# Patient Record
Sex: MALE | Race: WHITE | NOT HISPANIC OR LATINO | Employment: UNEMPLOYED | URBAN - METROPOLITAN AREA
[De-identification: names, ages, dates, MRNs, and addresses within clinical notes are randomized per-mention and may not be internally consistent; named-entity substitution may affect disease eponyms.]

---

## 2021-05-28 ENCOUNTER — MEDICAL CORRESPONDENCE (OUTPATIENT)
Dept: HEALTH INFORMATION MANAGEMENT | Facility: CLINIC | Age: 56
End: 2021-05-28

## 2021-06-01 ENCOUNTER — TRANSCRIBE ORDERS (OUTPATIENT)
Dept: OTHER | Age: 56
End: 2021-06-01

## 2021-06-01 DIAGNOSIS — N02.2 MEMBRANOUS NEPHROPATHY DETERMINED BY BIOPSY: Primary | ICD-10-CM

## 2021-06-03 ENCOUNTER — VIRTUAL VISIT (OUTPATIENT)
Dept: CARDIOLOGY | Facility: CLINIC | Age: 56
End: 2021-06-03
Payer: COMMERCIAL

## 2021-06-03 ENCOUNTER — TRANSCRIBE ORDERS (OUTPATIENT)
Dept: OTHER | Age: 56
End: 2021-06-03

## 2021-06-03 DIAGNOSIS — E78.5 HYPERLIPIDEMIA LDL GOAL <100: ICD-10-CM

## 2021-06-03 DIAGNOSIS — N05.2 IDIOPATHIC MEMBRANOUS GLOMERULONEPHRITIS: ICD-10-CM

## 2021-06-03 DIAGNOSIS — R93.1 ABNORMAL SCREENING CARDIAC CT: ICD-10-CM

## 2021-06-03 DIAGNOSIS — R07.9 EXERTIONAL CHEST PAIN: Primary | ICD-10-CM

## 2021-06-03 DIAGNOSIS — I10 BENIGN ESSENTIAL HYPERTENSION: ICD-10-CM

## 2021-06-03 DIAGNOSIS — N02.2 MEMBRANOUS NEPHROPATHY DETERMINED BY BIOPSY: Primary | ICD-10-CM

## 2021-06-03 DIAGNOSIS — R06.09 DYSPNEA ON EXERTION: ICD-10-CM

## 2021-06-03 PROCEDURE — 99205 OFFICE O/P NEW HI 60 MIN: CPT | Mod: 95 | Performed by: INTERNAL MEDICINE

## 2021-06-03 RX ORDER — ISOSORBIDE MONONITRATE 30 MG/1
30 TABLET, EXTENDED RELEASE ORAL
COMMUNITY
Start: 2020-10-05

## 2021-06-03 RX ORDER — NITROGLYCERIN 0.4 MG/1
0.4 TABLET SUBLINGUAL
COMMUNITY
Start: 2020-09-01

## 2021-06-03 RX ORDER — METOPROLOL SUCCINATE 25 MG/1
25 TABLET, EXTENDED RELEASE ORAL
COMMUNITY
Start: 2020-09-01

## 2021-06-03 RX ORDER — FUROSEMIDE 20 MG
TABLET ORAL
COMMUNITY
Start: 2020-09-09 | End: 2021-10-27 | Stop reason: ALTCHOICE

## 2021-06-03 RX ORDER — LISINOPRIL 5 MG/1
5 TABLET ORAL
COMMUNITY
Start: 2016-09-01 | End: 2021-08-20

## 2021-06-03 RX ORDER — PREDNISONE 10 MG/1
TABLET ORAL
COMMUNITY
Start: 2020-09-01 | End: 2021-08-20 | Stop reason: DRUGHIGH

## 2021-06-03 RX ORDER — ISOSORBIDE DINITRATE 30 MG/1
TABLET ORAL
COMMUNITY
Start: 2020-09-01

## 2021-06-03 RX ORDER — ATORVASTATIN CALCIUM 40 MG/1
40 TABLET, FILM COATED ORAL
COMMUNITY
Start: 2020-09-01 | End: 2021-09-03

## 2021-06-03 RX ORDER — FUROSEMIDE 20 MG
20 TABLET ORAL
COMMUNITY
Start: 2016-09-01 | End: 2021-10-27 | Stop reason: ALTCHOICE

## 2021-06-03 RX ORDER — ASPIRIN 81 MG/1
81 TABLET, CHEWABLE ORAL
COMMUNITY
Start: 2020-09-01

## 2021-06-03 NOTE — PATIENT INSTRUCTIONS
Thank you for coming to the [facility] @ [place]; please note the following instructions:    1. Labs scheduled        If you have any questions regarding your visit please contact your care team:     Cardiology  Telephone Number   Christin PAGE, RN  Savanna CARL, RN   Amee HOROWITZ, GWEN FISH, GWEN VILLALBA, LPN   [phone] (option 1)   For scheduling appts:     [phone] (select option 1)       For the Device Clinic (Pacemakers and ICD's)  RN's :  Mahi Ngo   During business hours: [phone]    *After business hours:  [phone] (select option 4)      Normal test result notifications will be released via [employer] or mailed within 7 business days.  All other test results, will be communicated via telephone once reviewed by your cardiologist.    If you need a medication refill please contact your pharmacy.  Please allow 3 business days for your refill to be completed.    As always, thank you for trusting us with your health care needs!

## 2021-06-03 NOTE — LETTER
6/3/2021       RE: Alex Etienne  [address] [place] [address]       Alex is a 56 year old who is being evaluated via a billable video visit.        How would you like to obtain your AVS? MyChart  If the video visit is dropped, the invitation should be resent by: Other e-mail: [email or web address]  Will anyone else be joining your video visit? No    Video Start Time: 3.08PM  Video-Visit Details    Type of service:  Video Visit     Video End Time:3.21PM.    Originating Location (pt. Location): Home    Distant Location (provider location):  [facility] [place]     Platform used for Video Visit: [facility]     Leonidas Johnson is a 56 year old who presents for evaluation of exertional chest pain and shortness of breath.    HPI   Patient was a marathon runner.  Worked in [employer].  In 2019 he could run 8 miles without problem.  Lately he is unable to run more than 2 blocks due to chest heaviness and shortness of breath.  He can walk any distance without symptoms.  Go up and down the steps few times but any running because of chest discomfort.  This is progressive.  He has no prior cardiac history.  No prior cardiac symptoms.  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  He was treated with steroids and later on rituximab.  He do have hypertension and hyper dyslipidemia with LDL about 260.  He had nephrotic syndrome at the diagnosis of glomerulonephritis.  Currently patient is very much restricted and he is unable to run.  He is very concerned.    Review of Systems   Constitutional, HEENT, cardiovascular, pulmonary, gi and gu systems are negative, except as otherwise noted.      Objective    Vitals - Patient Reported  Systolic (Patient Reported): 134  Diastolic (Patient Reported): 64  Weight (Patient Reported): 138.3 kg (305 lb)  Pulse (Patient Reported): 64        Physical Exam   GENERAL: Healthy,  alert and no distress  EYES: Eyes grossly normal to inspection.  No discharge or erythema, or obvious scleral/conjunctival abnormalities.  RESP: No audible wheeze, cough, or visible cyanosis.  No visible retractions or increased work of breathing.    SKIN: Visible skin clear. No significant rash, abnormal pigmentation or lesions.  NEURO: Cranial nerves grossly intact.  Mentation and speech appropriate for age.  PSYCH: Mentation appears normal, affect normal/bright, judgement and insight intact, normal speech and appearance well-groomed.    ASSESSMENT/PLAN:    Patient here for evaluation of exertional chest pain and shortness of breath.  Prior to 2020 he was a marathon runner without any symptoms.  He could run more than 8 miles without any problem.  Currently patient is unable to run more than 2 blocks due to chest heaviness and shortness of breath.  Symptoms are relieved by rest.  He has no significant prior cardiac history.  He was diagnosed with a renal vein thrombosis and idiopathic membranous glomerulonephritis in 2020.  Initially treated with steroids and currently he is on rituximab.  Patient explained his shortness of breath and have asked evaluated at [facility].  His cardiac risk factors are hypertension and hyperlipidemia with LDL above 260 on 40 mg of Lipitor he is a non-smoker.  No diabetes.  No premature coronary artery disease in family.  Reviewed records from [facility].  He had an exercise stress nuclear stress test at [facility].  This was normal with normal ejection fraction and no regional wall motion abnormalities.  But the CT portion revealed a calcium score of 9 on 9 Agatston units.  Left main 0, .6, circumflex 55.1 and .7.  Patient was empirically started on isosorbide mononitrate.  Currently patient's activities are limited by his exertional symptoms.  His last creatinine was checked a year ago.  Which was 1.5.  Due to his progressive nature of symptoms and activity  limitation and heavy coronary calcification patient will need a coronary angiogram.  Risk benefit explained.  Contrast agent and renal dysfunction discussed.  We will repeat a basic metabolic panel.  If the creatinine is stable patient will be scheduled for a coronary angiogram.  He will also require better control of his lipid profile.  This will be addressed after the coronary angiogram.  Meanwhile patient is advised to continue his current medications.    Total visit duration 60 minutes.  This includes video interview, chart review, review of Care Everywhere, review of lab results, echocardiogram, stress test result and documentation.      LARA Khan MD

## 2021-06-03 NOTE — LETTER
6/3/2021      RE: Alex Etienne  [address] [place] [address]       Dear Colleague,    Thank you for the opportunity to participate in the care of your patient, Alex Etienne, at the [facility]. Please see a copy of my visit note below.    Alex is a 56 year old who is being evaluated via a billable video visit.        How would you like to obtain your AVS? MyChart  If the video visit is dropped, the invitation should be resent by: Other e-mail: [employer]  Will anyone else be joining your video visit? No    Video Start Time: 3.08PM  Video-Visit Details    Type of service:  Video Visit     Video End Time:3.21PM.    Originating Location (pt. Location): Home    Distant Location (provider location):  [facility]     Platform used for Video Visit: [employer]     Leonidas   Alex is a 56 year old who presents for evaluation of exertional chest pain and shortness of breath.    HPI   Patient was a marathon runner.  Worked in [employer].  In 2019 he could run 8 miles without problem.  Lately he is unable to run more than 2 blocks due to chest heaviness and shortness of breath.  He can walk any distance without symptoms.  Go up and down the steps few times but any running because of chest discomfort.  This is progressive.  He has no prior cardiac history.  No prior cardiac symptoms.  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  He was treated with steroids and later on rituximab.  He do have hypertension and hyper dyslipidemia with LDL about 260.  He had nephrotic syndrome at the diagnosis of glomerulonephritis.  Currently patient is very much restricted and he is unable to run.  He is very concerned.    Review of Systems   Constitutional, HEENT, cardiovascular, pulmonary, gi and gu systems are negative,  except as otherwise noted.      Objective    Vitals - Patient Reported  Systolic (Patient Reported): 134  Diastolic (Patient Reported): 64  Weight (Patient Reported): 138.3 kg (305 lb)  Pulse (Patient Reported): 64        Physical Exam   GENERAL: Healthy, alert and no distress  EYES: Eyes grossly normal to inspection.  No discharge or erythema, or obvious scleral/conjunctival abnormalities.  RESP: No audible wheeze, cough, or visible cyanosis.  No visible retractions or increased work of breathing.    SKIN: Visible skin clear. No significant rash, abnormal pigmentation or lesions.  NEURO: Cranial nerves grossly intact.  Mentation and speech appropriate for age.  PSYCH: Mentation appears normal, affect normal/bright, judgement and insight intact, normal speech and appearance well-groomed.    ASSESSMENT/PLAN:    Patient here for evaluation of exertional chest pain and shortness of breath.  Prior to 2020 he was a marathon runner without any symptoms.  He could run more than 8 miles without any problem.  Currently patient is unable to run more than 2 blocks due to chest heaviness and shortness of breath.  Symptoms are relieved by rest.  He has no significant prior cardiac history.  He was diagnosed with a renal vein thrombosis and idiopathic membranous glomerulonephritis in 2020.  Initially treated with steroids and currently he is on rituximab.  Patient explained his shortness of breath and have asked evaluated at [facility].  His cardiac risk factors are hypertension and hyperlipidemia with LDL above 260 on 40 mg of Lipitor he is a non-smoker.  No diabetes.  No premature coronary artery disease in family.  Reviewed records from [facility].  He had an exercise stress nuclear stress test at [facility].  This was normal with normal ejection fraction and no regional wall motion abnormalities.  But the CT portion revealed a calcium score of 9 on 9 Agatston units.  Left main 0, .6, circumflex 55.1 and RCA  682.7.  Patient was empirically started on isosorbide mononitrate.  Currently patient's activities are limited by his exertional symptoms.  His last creatinine was checked a year ago.  Which was 1.5.  Due to his progressive nature of symptoms and activity limitation and heavy coronary calcification patient will need a coronary angiogram.  Risk benefit explained.  Contrast agent and renal dysfunction discussed.  We will repeat a basic metabolic panel.  If the creatinine is stable patient will be scheduled for a coronary angiogram.  He will also require better control of his lipid profile.  This will be addressed after the coronary angiogram.  Meanwhile patient is advised to continue his current medications.    Total visit duration 60 minutes.  This includes video interview, chart review, review of Care Everywhere, review of lab results, echocardiogram, stress test result and documentation.      Please do not hesitate to contact me if you have any questions/concerns.     Sincerely,     LARA Khan MD

## 2021-06-03 NOTE — PROGRESS NOTES
Alex is a 56 year old who is being evaluated via a billable video visit.        How would you like to obtain your AVS? [employer]  If the video visit is dropped, the invitation should be resent by: Other e-mail: [employer]  Will anyone else be joining your video visit? No    Video Start Time: 3.08PM  Video-Visit Details    Type of service:  Video Visit     Video End Time:3.21PM.    Originating Location (pt. Location): Home    Distant Location (provider location):  [facility]     Platform used for Video Visit: [facility]     Leonidas   Alex is a 56 year old who presents for evaluation of exertional chest pain and shortness of breath.    HPI   Patient was a marathon runner.  Worked in [employer].  In 2019 he could run 8 miles without problem.  Lately he is unable to run more than 2 blocks due to chest heaviness and shortness of breath.  He can walk any distance without symptoms.  Go up and down the steps few times but any running because of chest discomfort.  This is progressive.  He has no prior cardiac history.  No prior cardiac symptoms.  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  He was treated with steroids and later on rituximab.  He do have hypertension and hyper dyslipidemia with LDL about 260.  He had nephrotic syndrome at the diagnosis of glomerulonephritis.  Currently patient is very much restricted and he is unable to run.  He is very concerned.    Review of Systems   Constitutional, HEENT, cardiovascular, pulmonary, gi and gu systems are negative, except as otherwise noted.      Objective    Vitals - Patient Reported  Systolic (Patient Reported): 134  Diastolic (Patient Reported): 64  Weight (Patient Reported): 138.3 kg (305 lb)  Pulse (Patient Reported): 64        Physical Exam   GENERAL: Healthy, alert and no distress  EYES: Eyes grossly normal to inspection.  No discharge or  erythema, or obvious scleral/conjunctival abnormalities.  RESP: No audible wheeze, cough, or visible cyanosis.  No visible retractions or increased work of breathing.    SKIN: Visible skin clear. No significant rash, abnormal pigmentation or lesions.  NEURO: Cranial nerves grossly intact.  Mentation and speech appropriate for age.  PSYCH: Mentation appears normal, affect normal/bright, judgement and insight intact, normal speech and appearance well-groomed.    ASSESSMENT/PLAN:    Patient here for evaluation of exertional chest pain and shortness of breath.  Prior to 2020 he was a marathon runner without any symptoms.  He could run more than 8 miles without any problem.  Currently patient is unable to run more than 2 blocks due to chest heaviness and shortness of breath.  Symptoms are relieved by rest.  He has no significant prior cardiac history.  He was diagnosed with a renal vein thrombosis and idiopathic membranous glomerulonephritis in 2020.  Initially treated with steroids and currently he is on rituximab.  Patient explained his shortness of breath and have asked evaluated at [facility].  His cardiac risk factors are hypertension and hyperlipidemia with LDL above 260 on 40 mg of Lipitor he is a non-smoker.  No diabetes.  No premature coronary artery disease in family.  Reviewed records from [facility].  He had an exercise stress nuclear stress test at [facility].  This was normal with normal ejection fraction and no regional wall motion abnormalities.  But the CT portion revealed a calcium score of 9 on 9 Agatston units.  Left main 0, .6, circumflex 55.1 and .7.  Patient was empirically started on isosorbide mononitrate.  Currently patient's activities are limited by his exertional symptoms.  His last creatinine was checked a year ago.  Which was 1.5.  Due to his progressive nature of symptoms and activity limitation and heavy coronary calcification patient will need a coronary angiogram.   Risk benefit explained.  Contrast agent and renal dysfunction discussed.  We will repeat a basic metabolic panel.  If the creatinine is stable patient will be scheduled for a coronary angiogram.  He will also require better control of his lipid profile.  This will be addressed after the coronary angiogram.  Meanwhile patient is advised to continue his current medications.    Total visit duration 60 minutes.  This includes video interview, chart review, review of Care Everywhere, review of lab results, echocardiogram, stress test result and documentation.

## 2021-06-03 NOTE — NURSING NOTE
Vitals - Patient Reported  Systolic (Patient Reported): 134  Diastolic (Patient Reported): 64  Weight (Patient Reported): 138.3 kg (305 lb)  Pulse (Patient Reported): 64    Alexsandra Cleveland MA

## 2021-06-04 DIAGNOSIS — I10 BENIGN ESSENTIAL HYPERTENSION: ICD-10-CM

## 2021-06-04 DIAGNOSIS — Z11.59 ENCOUNTER FOR SCREENING FOR OTHER VIRAL DISEASES: ICD-10-CM

## 2021-06-04 DIAGNOSIS — R06.09 DYSPNEA ON EXERTION: ICD-10-CM

## 2021-06-04 DIAGNOSIS — R07.9 EXERTIONAL CHEST PAIN: ICD-10-CM

## 2021-06-04 PROBLEM — R93.1 ABNORMAL SCREENING CARDIAC CT: Status: ACTIVE | Noted: 2021-06-04

## 2021-06-04 LAB
ANION GAP SERPL CALCULATED.3IONS-SCNC: 4 MMOL/L (ref 3–14)
BUN SERPL-MCNC: 20 MG/DL (ref 7–30)
CALCIUM SERPL-MCNC: 8.7 MG/DL (ref 8.5–10.1)
CHLORIDE SERPL-SCNC: 109 MMOL/L (ref 94–109)
CO2 SERPL-SCNC: 30 MMOL/L (ref 20–32)
CREAT SERPL-MCNC: 1.5 MG/DL (ref 0.66–1.25)
GFR SERPL CREATININE-BSD FRML MDRD: 52 ML/MIN/{1.73_M2}
GLUCOSE SERPL-MCNC: 158 MG/DL (ref 70–99)
POTASSIUM SERPL-SCNC: 3.6 MMOL/L (ref 3.4–5.3)
SODIUM SERPL-SCNC: 143 MMOL/L (ref 133–144)

## 2021-06-04 PROCEDURE — 80048 BASIC METABOLIC PNL TOTAL CA: CPT | Performed by: INTERNAL MEDICINE

## 2021-06-04 PROCEDURE — 36415 COLL VENOUS BLD VENIPUNCTURE: CPT | Performed by: INTERNAL MEDICINE

## 2021-06-07 ENCOUNTER — MYC MEDICAL ADVICE (OUTPATIENT)
Dept: CARDIOLOGY | Facility: CLINIC | Age: 56
End: 2021-06-07

## 2021-06-07 RX ORDER — LIDOCAINE 40 MG/G
CREAM TOPICAL
Status: CANCELLED | OUTPATIENT
Start: 2021-06-07

## 2021-06-07 RX ORDER — SODIUM CHLORIDE 9 MG/ML
INJECTION, SOLUTION INTRAVENOUS CONTINUOUS
Status: CANCELLED | OUTPATIENT
Start: 2021-06-07

## 2021-06-07 NOTE — LETTER
June 7, 2021      TO: Alex Etienne  [address] [place] [address]         Dear Alex,    You are scheduled for a Coronary Angiogram at the [facility], [address] [place] [address], [place] [address].   Please arrive to the [facility] Waiting Room on ____Thursday, June 10th ______  at ____8:00 am_______.         You will need to undergo a COVID-19 PCR swab test within 4 days of procedure. You will receive a phone call with more information. SCHEDULED FOR 6/8 AT 2:30 PM [facility]       When you arrive you will get your labs drawn first then you will be escorted back to the pre procedure area. Here they will insert an IV, and obtain a short medical history. Please bring an updated list of your current medications.     A provider will come and talk with you about the procedure and obtain consent.     A nurse from the Cardiac Catheterization Lab will then escort you to the procedure area. You will be receiving sedation during the procedure so you will need someone to drive you to and from the hospital.     After the procedure you will recover for a short period (2 - 6 hours). You will be discharged with instructions for post procedure care. However, depending on what the angiogram shows you may have to have stents placed and this might require an overnight stay. We ask that you bring a small bag of necessities for your comfort if you would need to stay overnight. DO NOT BRING ANY VALUABLES!        Pre procedure instructions:  It is required that you undergo a COVID-19 PCR swab test 48-72 hours before procedure. SCHEDULED FOR 6/8 AT 2:30 PM [facility]  1. Make arrangements to have a  as you will not be allowed to drive following the procedure. Someone should stay with you the night after your procedure.  2.  Do not eat any solid food or milk products for 8 hours prior to the exam. You may drink clear liquids until 2 hours prior to the exam.  Clear liquids include the following: water, Jell-O, clear broth, apple juice or any non-carbonated drink that you can see through (no pop!).   3. Do not drink alcohol or smoke 24 hours prior to test.  4. Hold these meds:  Do not take your oral diabetic medication or short acting insulin the day of the procedure. Do not take metformin the day of and for 2 days following, contact your PCP regarding glucose control during this time.  5. You may take your other morning medications as prescribed with a sip of water. If you currently take an aspirin, continue taking your same dose. If not, take a 325 mg aspirin the day before and the day of your procedure.            If you have further questions, please utilize [employer] to contact us.   If your question concerns the above instructions, contact:  Christin Lopez RN  Cardiology Care Coordinator  [facility]  [phone] option 1                 Post Procedure Instructions:  For 24 hours:    Have an adult stay with you for 24 hours.    Relax and take it easy.    Drink plenty of fluids.    You may eat your normal diet, unless your doctor tells you otherwise.    Do NOT make any important or legal decisions.    Do NOT drive or operate machines at home or at work.    Do NOT drink alcohol.    Do NOT smoke.     Medicines:    If you have begun Plavix (clopidogrel), Effient (prasugrel), or Brilinta (ticagrelor), do not stop taking it until you talk to your heart doctor (cardiologist).     If you are on metformin (Glucophage), do not restart it until you have blood tests (within 2 to 3 days after discharge). When your doctor tells you it is safe, you may restart the metformin.    If you have stopped any other medicines, check with your nurse or provider about when to restart them.     Care of wrist or arm site:    It is normal to have soreness at the puncture site and mild tingling in your hand for up to 3 days.    Remove the Band-Aid after 24 hours. If  there is minor oozing, apply another Band-aid and remove it after 12 hours.    Do NOT take a bath, or use a hot tub or pool for the next 48 hours. You may shower.    It is normal to have a small bruise. There should not be a lump at the site.    Do not scrub the site.    Do not use lotion or powder near the puncture site for 3 days.     Activity Restrictions    For 2 days, do not use your hand or arm to support your weight (such as rising from a chair) or bend your wrist  (such as lifting a garage door).    For 2 days, do not lift more than 5 pounds or exercise your arm (tennis, golf or bowling).        If you start bleeding from the site in your arm: Sit down and press firmly on the site with your fingers for 10 minutes.  Call your doctor as soon as you can.  Call 911 right away if you have bleeding that is heavy or does not stop.  Call your doctor if:    You have a large or growing hard lump around the site.    The site is red, swollen, hot or tender.

## 2021-06-08 ENCOUNTER — TELEPHONE (OUTPATIENT)
Dept: CARDIOLOGY | Facility: CLINIC | Age: 56
End: 2021-06-08

## 2021-06-08 DIAGNOSIS — Z11.59 ENCOUNTER FOR SCREENING FOR OTHER VIRAL DISEASES: ICD-10-CM

## 2021-06-08 LAB
SARS-COV-2 RNA RESP QL NAA+PROBE: NORMAL
SPECIMEN SOURCE: NORMAL

## 2021-06-08 PROCEDURE — U0005 INFEC AGEN DETEC AMPLI PROBE: HCPCS | Performed by: INTERNAL MEDICINE

## 2021-06-08 PROCEDURE — U0003 INFECTIOUS AGENT DETECTION BY NUCLEIC ACID (DNA OR RNA); SEVERE ACUTE RESPIRATORY SYNDROME CORONAVIRUS 2 (SARS-COV-2) (CORONAVIRUS DISEASE [COVID-19]), AMPLIFIED PROBE TECHNIQUE, MAKING USE OF HIGH THROUGHPUT TECHNOLOGIES AS DESCRIBED BY CMS-2020-01-R: HCPCS | Performed by: INTERNAL MEDICINE

## 2021-06-09 ENCOUNTER — TELEPHONE (OUTPATIENT)
Dept: CARDIOLOGY | Facility: CLINIC | Age: 56
End: 2021-06-09

## 2021-06-09 LAB
LABORATORY COMMENT REPORT: NORMAL
SARS-COV-2 RNA RESP QL NAA+PROBE: NEGATIVE
SPECIMEN SOURCE: NORMAL

## 2021-06-09 NOTE — TELEPHONE ENCOUNTER
Call complete for pre procedure reminder, travel screen and updated visitor policy.  COVID Negative.

## 2021-06-10 ENCOUNTER — APPOINTMENT (OUTPATIENT)
Dept: LAB | Facility: CLINIC | Age: 56
End: 2021-06-10
Attending: INTERNAL MEDICINE
Payer: COMMERCIAL

## 2021-06-10 ENCOUNTER — HOSPITAL ENCOUNTER (OUTPATIENT)
Facility: CLINIC | Age: 56
Discharge: HOME | End: 2021-06-10
Attending: INTERNAL MEDICINE | Admitting: INTERNAL MEDICINE
Payer: COMMERCIAL

## 2021-06-10 ENCOUNTER — APPOINTMENT (OUTPATIENT)
Dept: MEDSURG UNIT | Facility: CLINIC | Age: 56
End: 2021-06-10
Attending: INTERNAL MEDICINE
Payer: COMMERCIAL

## 2021-06-10 VITALS
TEMPERATURE: 98.6 F | HEART RATE: 53 BPM | DIASTOLIC BLOOD PRESSURE: 81 MMHG | BODY MASS INDEX: 39.96 KG/M2 | HEIGHT: 72 IN | RESPIRATION RATE: 16 BRPM | WEIGHT: 295 LBS | OXYGEN SATURATION: 93 % | SYSTOLIC BLOOD PRESSURE: 126 MMHG

## 2021-06-10 DIAGNOSIS — I10 BENIGN ESSENTIAL HYPERTENSION: ICD-10-CM

## 2021-06-10 DIAGNOSIS — Z98.890 S/P CORONARY ANGIOGRAM: Primary | ICD-10-CM

## 2021-06-10 DIAGNOSIS — R93.1 ABNORMAL SCREENING CARDIAC CT: ICD-10-CM

## 2021-06-10 DIAGNOSIS — R06.09 DYSPNEA ON EXERTION: ICD-10-CM

## 2021-06-10 DIAGNOSIS — R07.9 EXERTIONAL CHEST PAIN: ICD-10-CM

## 2021-06-10 LAB
ANION GAP SERPL CALCULATED.3IONS-SCNC: 6 MMOL/L (ref 3–14)
BUN SERPL-MCNC: 22 MG/DL (ref 7–30)
CALCIUM SERPL-MCNC: 9 MG/DL (ref 8.5–10.1)
CHLORIDE SERPL-SCNC: 104 MMOL/L (ref 94–109)
CO2 SERPL-SCNC: 29 MMOL/L (ref 20–32)
CREAT SERPL-MCNC: 1.5 MG/DL (ref 0.66–1.25)
ERYTHROCYTE [DISTWIDTH] IN BLOOD BY AUTOMATED COUNT: 13.2 % (ref 10–15)
GFR SERPL CREATININE-BSD FRML MDRD: 52 ML/MIN/{1.73_M2}
GLUCOSE SERPL-MCNC: 121 MG/DL (ref 70–99)
HCT VFR BLD AUTO: 43.7 % (ref 40–53)
HGB BLD-MCNC: 14.4 G/DL (ref 13.3–17.7)
MCH RBC QN AUTO: 28.5 PG (ref 26.5–33)
MCHC RBC AUTO-ENTMCNC: 33 G/DL (ref 31.5–36.5)
MCV RBC AUTO: 87 FL (ref 78–100)
PLATELET # BLD AUTO: 254 10E9/L (ref 150–450)
POTASSIUM SERPL-SCNC: 3.8 MMOL/L (ref 3.4–5.3)
RBC # BLD AUTO: 5.05 10E12/L (ref 4.4–5.9)
SODIUM SERPL-SCNC: 139 MMOL/L (ref 133–144)
WBC # BLD AUTO: 8.9 10E9/L (ref 4–11)

## 2021-06-10 PROCEDURE — 80048 BASIC METABOLIC PNL TOTAL CA: CPT | Performed by: INTERNAL MEDICINE

## 2021-06-10 PROCEDURE — 250N000009 HC RX 250: Performed by: INTERNAL MEDICINE

## 2021-06-10 PROCEDURE — 272N000001 HC OR GENERAL SUPPLY STERILE: Performed by: INTERNAL MEDICINE

## 2021-06-10 PROCEDURE — 250N000011 HC RX IP 250 OP 636: Performed by: INTERNAL MEDICINE

## 2021-06-10 PROCEDURE — 250N000013 HC RX MED GY IP 250 OP 250 PS 637: Performed by: INTERNAL MEDICINE

## 2021-06-10 PROCEDURE — 93458 L HRT ARTERY/VENTRICLE ANGIO: CPT | Performed by: INTERNAL MEDICINE

## 2021-06-10 PROCEDURE — 250N000011 HC RX IP 250 OP 636: Performed by: PHYSICIAN ASSISTANT

## 2021-06-10 PROCEDURE — 258N000003 HC RX IP 258 OP 636: Performed by: INTERNAL MEDICINE

## 2021-06-10 PROCEDURE — 999N000142 HC STATISTIC PROCEDURE PREP ONLY

## 2021-06-10 PROCEDURE — C1894 INTRO/SHEATH, NON-LASER: HCPCS | Performed by: INTERNAL MEDICINE

## 2021-06-10 PROCEDURE — 99152 MOD SED SAME PHYS/QHP 5/>YRS: CPT | Performed by: INTERNAL MEDICINE

## 2021-06-10 PROCEDURE — 93005 ELECTROCARDIOGRAM TRACING: CPT

## 2021-06-10 PROCEDURE — 93010 ELECTROCARDIOGRAM REPORT: CPT | Performed by: INTERNAL MEDICINE

## 2021-06-10 PROCEDURE — 36415 COLL VENOUS BLD VENIPUNCTURE: CPT | Performed by: INTERNAL MEDICINE

## 2021-06-10 PROCEDURE — 999N000054 HC STATISTIC EKG NON-CHARGEABLE

## 2021-06-10 PROCEDURE — 99153 MOD SED SAME PHYS/QHP EA: CPT | Performed by: INTERNAL MEDICINE

## 2021-06-10 PROCEDURE — 85027 COMPLETE CBC AUTOMATED: CPT | Performed by: INTERNAL MEDICINE

## 2021-06-10 RX ORDER — TIROFIBAN HYDROCHLORIDE 50 UG/ML
0.15 INJECTION INTRAVENOUS CONTINUOUS PRN
Status: DISCONTINUED | OUTPATIENT
Start: 2021-06-10 | End: 2021-06-10 | Stop reason: HOSPADM

## 2021-06-10 RX ORDER — NALOXONE HYDROCHLORIDE 0.4 MG/ML
0.4 INJECTION, SOLUTION INTRAMUSCULAR; INTRAVENOUS; SUBCUTANEOUS
Status: DISCONTINUED | OUTPATIENT
Start: 2021-06-10 | End: 2021-06-10 | Stop reason: HOSPADM

## 2021-06-10 RX ORDER — ATROPINE SULFATE 0.1 MG/ML
0.5 INJECTION INTRAVENOUS
Status: DISCONTINUED | OUTPATIENT
Start: 2021-06-10 | End: 2021-06-10 | Stop reason: HOSPADM

## 2021-06-10 RX ORDER — ARGATROBAN 1 MG/ML
350 INJECTION, SOLUTION INTRAVENOUS
Status: DISCONTINUED | OUTPATIENT
Start: 2021-06-10 | End: 2021-06-10 | Stop reason: HOSPADM

## 2021-06-10 RX ORDER — DOPAMINE HYDROCHLORIDE 160 MG/100ML
2-20 INJECTION, SOLUTION INTRAVENOUS CONTINUOUS PRN
Status: DISCONTINUED | OUTPATIENT
Start: 2021-06-10 | End: 2021-06-10 | Stop reason: HOSPADM

## 2021-06-10 RX ORDER — NALOXONE HYDROCHLORIDE 0.4 MG/ML
0.2 INJECTION, SOLUTION INTRAMUSCULAR; INTRAVENOUS; SUBCUTANEOUS
Status: DISCONTINUED | OUTPATIENT
Start: 2021-06-10 | End: 2021-06-10

## 2021-06-10 RX ORDER — NITROGLYCERIN 20 MG/100ML
10-200 INJECTION INTRAVENOUS CONTINUOUS PRN
Status: DISCONTINUED | OUTPATIENT
Start: 2021-06-10 | End: 2021-06-10 | Stop reason: HOSPADM

## 2021-06-10 RX ORDER — SODIUM CHLORIDE 9 MG/ML
INJECTION, SOLUTION INTRAVENOUS CONTINUOUS
Status: DISCONTINUED | OUTPATIENT
Start: 2021-06-10 | End: 2021-06-10 | Stop reason: HOSPADM

## 2021-06-10 RX ORDER — LIDOCAINE 40 MG/G
CREAM TOPICAL
Status: DISCONTINUED | OUTPATIENT
Start: 2021-06-10 | End: 2021-06-10 | Stop reason: HOSPADM

## 2021-06-10 RX ORDER — NITROGLYCERIN 5 MG/ML
VIAL (ML) INTRAVENOUS
Status: DISCONTINUED | OUTPATIENT
Start: 2021-06-10 | End: 2021-06-10 | Stop reason: HOSPADM

## 2021-06-10 RX ORDER — NALOXONE HYDROCHLORIDE 0.4 MG/ML
0.4 INJECTION, SOLUTION INTRAMUSCULAR; INTRAVENOUS; SUBCUTANEOUS
Status: DISCONTINUED | OUTPATIENT
Start: 2021-06-10 | End: 2021-06-10

## 2021-06-10 RX ORDER — FENTANYL CITRATE 50 UG/ML
25-50 INJECTION, SOLUTION INTRAMUSCULAR; INTRAVENOUS
Status: ACTIVE | OUTPATIENT
Start: 2021-06-10 | End: 2021-06-10

## 2021-06-10 RX ORDER — EPTIFIBATIDE 2 MG/ML
15 INJECTION, SOLUTION INTRAVENOUS CONTINUOUS PRN
Status: DISCONTINUED | OUTPATIENT
Start: 2021-06-10 | End: 2021-06-10 | Stop reason: HOSPADM

## 2021-06-10 RX ORDER — EPTIFIBATIDE 2 MG/ML
180 INJECTION, SOLUTION INTRAVENOUS EVERY 10 MIN PRN
Status: DISCONTINUED | OUTPATIENT
Start: 2021-06-10 | End: 2021-06-10 | Stop reason: HOSPADM

## 2021-06-10 RX ORDER — IOPAMIDOL 755 MG/ML
INJECTION, SOLUTION INTRAVASCULAR
Status: DISCONTINUED | OUTPATIENT
Start: 2021-06-10 | End: 2021-06-10 | Stop reason: HOSPADM

## 2021-06-10 RX ORDER — DOBUTAMINE HYDROCHLORIDE 200 MG/100ML
2-20 INJECTION INTRAVENOUS CONTINUOUS PRN
Status: DISCONTINUED | OUTPATIENT
Start: 2021-06-10 | End: 2021-06-10 | Stop reason: HOSPADM

## 2021-06-10 RX ORDER — FLUMAZENIL 0.1 MG/ML
0.2 INJECTION, SOLUTION INTRAVENOUS
Status: DISCONTINUED | OUTPATIENT
Start: 2021-06-10 | End: 2021-06-10 | Stop reason: HOSPADM

## 2021-06-10 RX ORDER — NALOXONE HYDROCHLORIDE 0.4 MG/ML
0.2 INJECTION, SOLUTION INTRAMUSCULAR; INTRAVENOUS; SUBCUTANEOUS
Status: DISCONTINUED | OUTPATIENT
Start: 2021-06-10 | End: 2021-06-10 | Stop reason: HOSPADM

## 2021-06-10 RX ORDER — HEPARIN SODIUM 10000 [USP'U]/100ML
100-1000 INJECTION, SOLUTION INTRAVENOUS CONTINUOUS PRN
Status: DISCONTINUED | OUTPATIENT
Start: 2021-06-10 | End: 2021-06-10 | Stop reason: HOSPADM

## 2021-06-10 RX ORDER — ARGATROBAN 1 MG/ML
150 INJECTION, SOLUTION INTRAVENOUS
Status: DISCONTINUED | OUTPATIENT
Start: 2021-06-10 | End: 2021-06-10 | Stop reason: HOSPADM

## 2021-06-10 RX ORDER — FENTANYL CITRATE 50 UG/ML
INJECTION, SOLUTION INTRAMUSCULAR; INTRAVENOUS
Status: DISCONTINUED | OUTPATIENT
Start: 2021-06-10 | End: 2021-06-10 | Stop reason: HOSPADM

## 2021-06-10 RX ADMIN — FENTANYL CITRATE 25 MCG: 50 INJECTION, SOLUTION INTRAMUSCULAR; INTRAVENOUS at 11:07

## 2021-06-10 RX ADMIN — ASPIRIN 325 MG: 325 TABLET, COATED ORAL at 09:21

## 2021-06-10 RX ADMIN — SODIUM CHLORIDE: 9 INJECTION, SOLUTION INTRAVENOUS at 09:13

## 2021-06-10 ASSESSMENT — MIFFLIN-ST. JEOR: SCORE: 2216.11

## 2021-06-10 NOTE — PRE-PROCEDURE
GENERAL PRE-PROCEDURE:   Procedure:  Coronary angiogram  Date/Time:  6/10/2021 9:35 AM    Verbal consent obtained?: Yes    Written consent obtained?: Yes    Risks and benefits: Risks, benefits and alternatives were discussed    Consent given by:  Patient  Patient states understanding of procedure being performed: Yes    Patient's understanding of procedure matches consent: Yes    Procedure consent matches procedure scheduled: Yes    Appropriately NPO:  Yes  ASA Class:  Class 3- Severe systemic disease, definite functional limitations  Mallampati  :  Grade 3- soft palate visible, posterior pharyngeal wall not visible  Lungs:  Lungs clear with good breath sounds bilaterally  Heart:  Normal heart sounds and rate  History & Physical reviewed:  History and physical reviewed and no updates needed  Statement of review:  I have reviewed the lab findings, diagnostic data, medications, and the plan for sedation        Swapnil Garg PA-C  [facility] Cardiology Team

## 2021-06-10 NOTE — PROGRESS NOTES
D/I/A: Manual pressure held for 20 minutes to right femoral artery.  Sheath, size 4 Kinyarwanda,  pulled by RN at 11:14AM.  Site CDI, no hematoma.  Stasis achieved at 1135. Off bedrest @ 1335.  A+O x4 and making needs known.  Denies pain or sob.  VSSA.  Tolerated sheath removal well.  P: Continue to monitor status.  Discharge to home once meeting criteria.

## 2021-06-10 NOTE — PROGRESS NOTES
D/I/A:  Patient is tolerating liquids and foods, ambulating, urinating, puncture sites are stable ( no bleeding and no hematoma) and patient has a , daughter.  A+O x4 and making needs known.  CCL access sites C/D/I; no bleeding or hematoma; CMS intact.  VSSA.  SR on monitor.  IV access removed.  Education completed and outlined in AVS or handout: medications reviewed with patient.  Questions answered prior to discharge.  Belongings returned to patient at discharge.    P: Discharged to self care.  Patient to follow up with appts as per discharge instruction.

## 2021-06-10 NOTE — PROGRESS NOTES
D/I/A: Pt roomed on 3C in bay 35.  Arrived via litter and accompanied by RN On/Off: Off monitor.  VSSA.  Rhythm upon arrival SB on monitor.  Denies pain or sob.  Reviewed activity restrictions and when to notify RN, ie-changes to breathing or increased chest pressure or chest pain.  CCL access:  RFA 4FR in place; CMS intact.  P: Continue to monitor status.  Discharge to home once meeting criteria.

## 2021-06-10 NOTE — PROGRESS NOTES
Prep and teaching complete for Cors/Angiogram; pt awake and alert, denies pain; reports chest pain with running but not walking. Awaiting consent.  No potassium replacement needed per LILIANA Tripp Garg due to elevated CR.

## 2021-06-11 ENCOUNTER — TELEPHONE (OUTPATIENT)
Dept: CARDIOLOGY | Facility: CLINIC | Age: 56
End: 2021-06-11

## 2021-06-11 NOTE — TELEPHONE ENCOUNTER
----- Message from Reba Read RN sent at 6/11/2021 10:31 AM CDT -----  Regarding: angiogram 6/10  Hi,      Left ventricular filling pressures are mildly elevated. (LVEDP 16)    There is nonobstructive coronary artery disease. The Left Main is normal. There is myocardial bridging in the mid LAD with mild diffuse disease distally. There is a moderate to large sized RI with luminal irregularlities. The LCx ihas luminal irregularities. The RCA is large and has mild disease.    Right femoral artery used for access    No new meds at discharge    Malena

## 2021-06-11 NOTE — TELEPHONE ENCOUNTER
Call to pt. Left msg in follow up to [facility] yesterday,   Contact information left.   No follow up visit scheduled-     Asked pt tcb with questions or concerns and to schedule a follow up visit with Dr Manzano in 3-4 weeks.     My chart msg sent also

## 2021-06-14 LAB — INTERPRETATION ECG - MUSE: NORMAL

## 2021-06-15 NOTE — TELEPHONE ENCOUNTER
Msg from Dr Manzano:  He do not have significant CAD. So it is risk factor modification,diet,exercise,weight loss. No additional medications needed.He is at high risk for CAD progression due to heavy coronary calcification.His symptoms are due to small vessel disease and he is on medications for this.If he want to come to clinic I can discuss with him.   MELISSA

## 2021-06-15 NOTE — TELEPHONE ENCOUNTER
My chart msg from pt:     HI,     I feel fine and their is no bleeding or soreness form the area so everything seems well.     I do not need to see Dr Manzano because they did not change anything yesterday and they did not fix anything, therefore nothing will change in the next 3-4 weeks.   It's just another month of wasted time.     Dr. Manzano should have the test results that will determine the next course of action so we can move forward with correcting the problem.  We do not need to talk about it, we need to fix it.   Find out what the next step is and email me so we can get it done.     thanks.     richy morel

## 2021-06-15 NOTE — TELEPHONE ENCOUNTER
RECORDS RECEIVED FROM: [facility]   DATE RECEIVED: 08.04.2021   NOTES STATUS DETAILS   OFFICE NOTE from referring provider [facility] 05.28.2021 Shankar Chavez MD     OFFICE NOTE from other specialist  N/A    *Only VASCULITIS or LUPUS gather office notes for the following N/A    *PULMONARY   N/A    *ENT N/A    *DERMATOLOGY N/A    *RHEUMATOLOGY N/A    DISCHARGE SUMMARY from hospital N/A    DISCHARGE REPORT from the ER N/A    MEDICATION LIST Internal / CE    IMAGING  (NEED IMAGES AND REPORTS)     KIDNEY CT SCAN N/A    KIDNEY ULTRASOUND N/A    MR ABDOMEN N/A    NUCLEAR MEDICINE RENAL N/A    LABS     CBC Internal 06.10.2021   CMP Internal 06.10.2021   BMP Internal 06.10.2021   UA [facility] 07.01.2020   URINE PROTEIN [facility] 07.01.2020   RENAL PANEL N/A    BIOPSY     KIDNEY BIOPSY  N/A

## 2021-06-27 ENCOUNTER — HEALTH MAINTENANCE LETTER (OUTPATIENT)
Age: 56
End: 2021-06-27

## 2021-08-03 DIAGNOSIS — I10 BENIGN ESSENTIAL HYPERTENSION: Primary | ICD-10-CM

## 2021-08-03 NOTE — PROGRESS NOTES
Call to patient to see if he would be able to get his labs drawn before tomorrow if able.?  Labs last done in June.  Left Vm.   Provided number for call back with questions or concerns.  Marilyn Lopes LPN  Nephrology  [phone]

## 2021-08-04 ENCOUNTER — PRE VISIT (OUTPATIENT)
Dept: NEPHROLOGY | Facility: CLINIC | Age: 56
End: 2021-08-04

## 2021-08-04 ENCOUNTER — VIRTUAL VISIT (OUTPATIENT)
Dept: NEPHROLOGY | Facility: CLINIC | Age: 56
End: 2021-08-04
Attending: INTERNAL MEDICINE
Payer: COMMERCIAL

## 2021-08-04 DIAGNOSIS — N02.2 MEMBRANOUS NEPHROPATHY DETERMINED BY BIOPSY: Primary | ICD-10-CM

## 2021-08-04 DIAGNOSIS — E87.70 HYPERVOLEMIA ASSOCIATED WITH RENAL INSUFFICIENCY: ICD-10-CM

## 2021-08-04 DIAGNOSIS — N28.9 HYPERVOLEMIA ASSOCIATED WITH RENAL INSUFFICIENCY: ICD-10-CM

## 2021-08-04 PROCEDURE — 99204 OFFICE O/P NEW MOD 45 MIN: CPT | Mod: 95 | Performed by: STUDENT IN AN ORGANIZED HEALTH CARE EDUCATION/TRAINING PROGRAM

## 2021-08-04 RX ORDER — POTASSIUM CHLORIDE 1500 MG/1
20 TABLET, EXTENDED RELEASE ORAL DAILY
Qty: 30 TABLET | Refills: 11 | Status: SHIPPED | OUTPATIENT
Start: 2021-08-04

## 2021-08-04 RX ORDER — FUROSEMIDE 40 MG
40 TABLET ORAL DAILY
Qty: 30 TABLET | Refills: 11 | Status: SHIPPED | OUTPATIENT
Start: 2021-08-04 | End: 2021-10-27 | Stop reason: ALTCHOICE

## 2021-08-04 ASSESSMENT — PAIN SCALES - GENERAL: PAINLEVEL: NO PAIN (0)

## 2021-08-04 NOTE — LETTER
8/4/2021       RE: Alex Etienne  [address]  [place] [address]     Dear Colleague,    Thank you for referring your patient, Alex Etienne, to the [facility] NEPHROLOGY [facility]IC [place] at [facility]. Please see a copy of my visit note below.    Nephrology Initial Consultation    Assessment and Plan:    # Membranous Nephropathy  # CKD3a  Hx of membranous nephropathy diagnosed on biopsy in 2016, initially treated with tacrolimus and prednisone but then relapsed on taper of tac. He was treated with rituximab (2 doses 2 weeks apart) in Oct/Nov 2020 and was PLA2R positive prior but he did not have follow up with Nephrology after to obtain labs. I suspect his MN is active at this time, but we first need to obtain labs. We will plan for quick follow up so we can plan a treatment regimen.   - Obtain urine studies as previously ordered  - PLA2R antibody ordered    # Hypertension: BP controlled but pt is clearly hypervolemic based on weight gain in setting of likely nephrotic syndrome. Recommend starting lasix 40mg daily to start diuresis.        Assessment and plan was discussed with patient and he voiced his understanding and agreement.    I discussed the patient's plan of care with Dr. Gallegos.    Sherita Esteban MD  Nephrology Fellow    Consult:  Alex Etienne was seen in consultation at the request of Dr. Chavez for evaluation of CKD and hx of membranous nephropathy.    Reason for Visit:  Alex Etienne is a 56 year old male with HTN, CKD3a, membranous nephropathy (biopsy 2016), who presents for CKD evaluation.    HPI:  Mr. Etienne has a history of membranous nephropathy diagnosed on biopsy in July 2016. He had initially presented with lower extremity edema. 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 He was treated with tacrolimus and prednisone, but had a relapse on a decreased dose of  "tacrolimus in Aug 2020. He was then switched from tacrolimus to rituximab in the Fall of 2020 with his first dose in mid-October and a second dose in November. Prior to rituximab his PLA2R antibody was 120 and he had 4.3g of proteinuria. He did not have follow up labs done after the rituximab and so it is unknown how well he responded to it. Documentation from pt's prior nephrologist note that they intended to give additional doses at 6 and 12 months but were unable to reach the patient to schedule follow up. The patient has continued on prednisone and is still taking 20mg daily.    Today, he's reporting a decreased appetite but denies nausea or vomiting. He has \"some swelling\" but not as much as he's had in the past. He has been gradually gaining weight and feels he's put on 40-50lbs. No chest pain, no orthopnea. No family history of kidney disease.      Renal History:   Kidney Disease and Medical Hx:  h/o HTN: Yes  , h/o DM: No, h/o Protein in Urine: Yes , h/o Blood in Urine: No, h/o Kidney Stones:No, h/o UTI  No and h/o Chronic NSAID Use: No    ROS:   A comprehensive review of systems was obtained and negative, except as noted in the HPI or PMH.    Active Medical Problems:  Patient Active Problem List   Diagnosis     Exertional chest pain     Dyspnea on exertion     Benign essential hypertension     Abnormal screening cardiac CT     Status post coronary angiogram     PMH:   Medical record was reviewed and PMH was discussed with patient and noted below.  Past Medical History:   Diagnosis Date     HLD (hyperlipidemia)      Hypertension      Renal vein thrombosis (H)     POST BIOPSY     SOBOE (shortness of breath on exertion)      PSH:   Past Surgical History:   Procedure Laterality Date     CV CORONARY ANGIOGRAM N/A 6/10/2021    Procedure: CV CORONARY ANGIOGRAM;  Surgeon: George Mullins MD;  Location:  HEART CARDIAC CATH LAB     CV LEFT HEART CATH N/A 6/10/2021    Procedure: Left Heart Cath;  Surgeon: " George Mullins MD;  Location:  HEART CARDIAC CATH LAB     INCISION AND DRAINAGE HAND, COMBINED Right 2017     ORTHOPEDIC SURGERY  1984    PINS RIGHT ELBOW     RENAL BIOPSY  2016       Family Hx:   No family history on file.  Personal Hx:   Social History     Tobacco Use     Smoking status: Never Smoker     Smokeless tobacco: Never Used   Substance Use Topics     Alcohol use: Not Currently       Allergies:  No Known Allergies    Medications:  Current Outpatient Medications   Medication Sig     aspirin (ASA) 81 MG chewable tablet Take 81 mg by mouth     atorvastatin (LIPITOR) 40 MG tablet Take 40 mg by mouth     furosemide (LASIX) 20 MG tablet TAKE 2 TABLETS BY MOUTH TWICE DAILY     furosemide (LASIX) 40 MG tablet Take 1 tablet (40 mg) by mouth daily     isosorbide dinitrate (ISORDIL) 30 MG tablet      isosorbide mononitrate (IMDUR) 30 MG 24 hr tablet Take 30 mg by mouth     lisinopril (ZESTRIL) 5 MG tablet Take 5 mg by mouth     metoprolol succinate ER (TOPROL-XL) 25 MG 24 hr tablet Take 25 mg by mouth     nitroGLYcerin (NITROSTAT) 0.4 MG sublingual tablet Place 0.4 mg under the tongue     potassium chloride ER (K-TAB) 20 MEQ CR tablet Take 1 tablet (20 mEq) by mouth daily     predniSONE (DELTASONE) 10 MG tablet Take 3 tablets by mouth once daily     RITUXIMAB-ARRX IV      furosemide (LASIX) 20 MG tablet Take 20 mg by mouth     No current facility-administered medications for this visit.      Vitals:  There were no vitals taken for this visit.    Exam:  GEN: appears well, NAD  RESP: no increased WOB    LABS:   CMP  Recent Labs   Lab Test 06/10/21  0814 06/04/21  1142    143   POTASSIUM 3.8 3.6   CHLORIDE 104 109   CO2 29 30   ANIONGAP 6 4   * 158*   BUN 22 20   CR 1.50* 1.50*   GFRESTIMATED 52* 52*   GFRESTBLACK 60* 60*   JEANIE 9.0 8.7     No lab results found.  CBC  Recent Labs   Lab Test 06/10/21  0814   HGB 14.4   WBC 8.9   RBC 5.05   HCT 43.7   MCV 87   MCH 28.5   MCHC 33.0   RDW 13.2         URINE STUDIES  No lab results found.  No lab results found.  PTH  No lab results found.  IRON STUDIES  No lab results found.      Anabel Esteban MD      Alex is a 56 year old who is being evaluated via a billable video visit.      How would you like to obtain your AVS? MyChart  If the video visit is dropped, the invitation should be resent by: Text to cell phone: [phone]  Will anyone else be joining your video visit? No    Video Start Time: 3:06PM  Video-Visit Details    Type of service:  Video Visit    Video End Time:3:48PM    Originating Location (pt. Location): Home    Distant Location (provider location):  [facility] NEPHROLOGY [facility]     Platform used for Video Visit: [employer]      Attestation signed by Naveen Gallegos MD at 9/6/2021  5:16 PM:  This patient has been evaluated by me, Naveen Gallegos MD, on 8/4/21.  I discussed with the fellow, Dr. Esteban, and agree with the findings and plan in this note.  I have reviewed medications, vital signs and labs.    Naveen Gallegos MD      Again, thank you for allowing me to participate in the care of your patient.      Sincerely,    Anabel Esteban MD

## 2021-08-04 NOTE — PROGRESS NOTES
Nephrology Initial Consultation    Assessment and Plan:    # Membranous Nephropathy  # CKD3a  Hx of membranous nephropathy diagnosed on biopsy in 2016, initially treated with tacrolimus and prednisone but then relapsed on taper of tac. He was treated with rituximab (2 doses 2 weeks apart) in Oct/Nov 2020 and was PLA2R positive prior but he did not have follow up with Nephrology after to obtain labs. I suspect his MN is active at this time, but we first need to obtain labs. We will plan for quick follow up so we can plan a treatment regimen.   - Obtain urine studies as previously ordered  - PLA2R antibody ordered    # Hypertension: BP controlled but pt is clearly hypervolemic based on weight gain in setting of likely nephrotic syndrome. Recommend starting lasix 40mg daily to start diuresis.        Assessment and plan was discussed with patient and he voiced his understanding and agreement.    I discussed the patient's plan of care with Dr. Gallegos.    Sherita Esteban MD  Nephrology Fellow    Consult:  Alex Etienne was seen in consultation at the request of Dr. Chavez for evaluation of CKD and hx of membranous nephropathy.    Reason for Visit:  Alex Etienne is a 56 year old male with HTN, CKD3a, membranous nephropathy (biopsy 2016), who presents for CKD evaluation.    HPI:  Mr. Etienne has a history of membranous nephropathy diagnosed on biopsy in July 2016. He had initially presented with lower extremity edema. 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 He was treated with tacrolimus and prednisone, but had a relapse on a decreased dose of tacrolimus in Aug 2020. He was then switched from tacrolimus to rituximab in the Fall of 2020 with his first dose in mid-October and a second dose in November. Prior to rituximab his PLA2R antibody was 120 and he had 4.3g of proteinuria. He did not have follow up labs done after the rituximab and so it is  "unknown how well he responded to it. Documentation from pt's prior nephrologist note that they intended to give additional doses at 6 and 12 months but were unable to reach the patient to schedule follow up. The patient has continued on prednisone and is still taking 20mg daily.    Today, he's reporting a decreased appetite but denies nausea or vomiting. He has \"some swelling\" but not as much as he's had in the past. He has been gradually gaining weight and feels he's put on 40-50lbs. No chest pain, no orthopnea. No family history of kidney disease.      Renal History:   Kidney Disease and Medical Hx:  h/o HTN: Yes  , h/o DM: No, h/o Protein in Urine: Yes , h/o Blood in Urine: No, h/o Kidney Stones:No, h/o UTI  No and h/o Chronic NSAID Use: No    ROS:   A comprehensive review of systems was obtained and negative, except as noted in the HPI or PMH.    Active Medical Problems:  Patient Active Problem List   Diagnosis     Exertional chest pain     Dyspnea on exertion     Benign essential hypertension     Abnormal screening cardiac CT     Status post coronary angiogram     PMH:   Medical record was reviewed and PMH was discussed with patient and noted below.  Past Medical History:   Diagnosis Date     HLD (hyperlipidemia)      Hypertension      Renal vein thrombosis (H)     POST BIOPSY     SOBOE (shortness of breath on exertion)      PSH:   Past Surgical History:   Procedure Laterality Date     CV CORONARY ANGIOGRAM N/A 6/10/2021    Procedure: CV CORONARY ANGIOGRAM;  Surgeon: George Mullins MD;  Location:  HEART CARDIAC CATH LAB     CV LEFT HEART CATH N/A 6/10/2021    Procedure: Left Heart Cath;  Surgeon: George Mullins MD;  Location:  HEART CARDIAC CATH LAB     INCISION AND DRAINAGE HAND, COMBINED Right 2017     ORTHOPEDIC SURGERY  1984    PINS RIGHT ELBOW     RENAL BIOPSY  2016       Family Hx:   No family history on file.  Personal Hx:   Social History     Tobacco Use     Smoking status: Never Smoker "     Smokeless tobacco: Never Used   Substance Use Topics     Alcohol use: Not Currently       Allergies:  No Known Allergies    Medications:  Current Outpatient Medications   Medication Sig     aspirin (ASA) 81 MG chewable tablet Take 81 mg by mouth     atorvastatin (LIPITOR) 40 MG tablet Take 40 mg by mouth     furosemide (LASIX) 20 MG tablet TAKE 2 TABLETS BY MOUTH TWICE DAILY     furosemide (LASIX) 40 MG tablet Take 1 tablet (40 mg) by mouth daily     isosorbide dinitrate (ISORDIL) 30 MG tablet      isosorbide mononitrate (IMDUR) 30 MG 24 hr tablet Take 30 mg by mouth     lisinopril (ZESTRIL) 5 MG tablet Take 5 mg by mouth     metoprolol succinate ER (TOPROL-XL) 25 MG 24 hr tablet Take 25 mg by mouth     nitroGLYcerin (NITROSTAT) 0.4 MG sublingual tablet Place 0.4 mg under the tongue     potassium chloride ER (K-TAB) 20 MEQ CR tablet Take 1 tablet (20 mEq) by mouth daily     predniSONE (DELTASONE) 10 MG tablet Take 3 tablets by mouth once daily     RITUXIMAB-ARRX IV      furosemide (LASIX) 20 MG tablet Take 20 mg by mouth     No current facility-administered medications for this visit.      Vitals:  There were no vitals taken for this visit.    Exam:  GEN: appears well, NAD  RESP: no increased WOB    LABS:   CMP  Recent Labs   Lab Test 06/10/21  0814 06/04/21  1142    143   POTASSIUM 3.8 3.6   CHLORIDE 104 109   CO2 29 30   ANIONGAP 6 4   * 158*   BUN 22 20   CR 1.50* 1.50*   GFRESTIMATED 52* 52*   GFRESTBLACK 60* 60*   JEANIE 9.0 8.7     No lab results found.  CBC  Recent Labs   Lab Test 06/10/21  0814   HGB 14.4   WBC 8.9   RBC 5.05   HCT 43.7   MCV 87   MCH 28.5   MCHC 33.0   RDW 13.2        URINE STUDIES  No lab results found.  No lab results found.  PTH  No lab results found.  IRON STUDIES  No lab results found.      Anabel Esteban MD      Alex is a 56 year old who is being evaluated via a billable video visit.      How would you like to obtain your AVS? MyChart  If the video visit is  dropped, the invitation should be resent by: Text to cell phone: [phone]  Will anyone else be joining your video visit? No    Video Start Time: 3:06PM  Video-Visit Details    Type of service:  Video Visit    Video End Time:3:48PM    Originating Location (pt. Location): Home    Distant Location (provider location):  [facility] NEPHROLOGY CLINIC [place]     Platform used for Video Visit: [employer]

## 2021-08-20 ENCOUNTER — LAB (OUTPATIENT)
Dept: LAB | Facility: CLINIC | Age: 56
End: 2021-08-20
Attending: STUDENT IN AN ORGANIZED HEALTH CARE EDUCATION/TRAINING PROGRAM
Payer: COMMERCIAL

## 2021-08-20 ENCOUNTER — OFFICE VISIT (OUTPATIENT)
Dept: NEPHROLOGY | Facility: CLINIC | Age: 56
End: 2021-08-20
Attending: STUDENT IN AN ORGANIZED HEALTH CARE EDUCATION/TRAINING PROGRAM
Payer: COMMERCIAL

## 2021-08-20 VITALS
HEART RATE: 63 BPM | SYSTOLIC BLOOD PRESSURE: 143 MMHG | DIASTOLIC BLOOD PRESSURE: 89 MMHG | OXYGEN SATURATION: 93 % | BODY MASS INDEX: 40.39 KG/M2 | WEIGHT: 298.2 LBS | HEIGHT: 72 IN | TEMPERATURE: 98.3 F

## 2021-08-20 DIAGNOSIS — N02.2 MEMBRANOUS NEPHROPATHY DETERMINED BY BIOPSY: ICD-10-CM

## 2021-08-20 DIAGNOSIS — I10 BENIGN ESSENTIAL HYPERTENSION: ICD-10-CM

## 2021-08-20 DIAGNOSIS — N02.2 MEMBRANOUS NEPHROPATHY DETERMINED BY BIOPSY: Primary | ICD-10-CM

## 2021-08-20 LAB
ALBUMIN SERPL-MCNC: 3.8 G/DL (ref 3.4–5)
ALBUMIN UR-MCNC: 100 MG/DL
ANION GAP SERPL CALCULATED.3IONS-SCNC: 8 MMOL/L (ref 3–14)
APPEARANCE UR: CLEAR
BILIRUB UR QL STRIP: NEGATIVE
BUN SERPL-MCNC: 28 MG/DL (ref 7–30)
CALCIUM SERPL-MCNC: 9.4 MG/DL (ref 8.5–10.1)
CHLORIDE BLD-SCNC: 104 MMOL/L (ref 94–109)
CO2 SERPL-SCNC: 25 MMOL/L (ref 20–32)
COLOR UR AUTO: YELLOW
CREAT SERPL-MCNC: 1.63 MG/DL (ref 0.66–1.25)
CREAT UR-MCNC: 247 MG/DL
ERYTHROCYTE [DISTWIDTH] IN BLOOD BY AUTOMATED COUNT: 12.9 % (ref 10–15)
GFR SERPL CREATININE-BSD FRML MDRD: 47 ML/MIN/1.73M2
GLUCOSE BLD-MCNC: 108 MG/DL (ref 70–99)
GLUCOSE UR STRIP-MCNC: NEGATIVE MG/DL
HCT VFR BLD AUTO: 44.7 % (ref 40–53)
HGB BLD-MCNC: 15 G/DL (ref 13.3–17.7)
HGB UR QL STRIP: ABNORMAL
KETONES UR STRIP-MCNC: NEGATIVE MG/DL
LEUKOCYTE ESTERASE UR QL STRIP: NEGATIVE
Lab: NORMAL
MCH RBC QN AUTO: 28.6 PG (ref 26.5–33)
MCHC RBC AUTO-ENTMCNC: 33.6 G/DL (ref 31.5–36.5)
MCV RBC AUTO: 85 FL (ref 78–100)
MUCOUS THREADS #/AREA URNS LPF: PRESENT /LPF
NITRATE UR QL: NEGATIVE
PERFORMING LABORATORY: NORMAL
PH UR STRIP: 5 [PH] (ref 5–7)
PHOSPHATE SERPL-MCNC: 3.1 MG/DL (ref 2.5–4.5)
PLATELET # BLD AUTO: 250 10E3/UL (ref 150–450)
POTASSIUM BLD-SCNC: 3.8 MMOL/L (ref 3.4–5.3)
PROT UR-MCNC: 0.64 G/L
PROT/CREAT 24H UR: 0.26 G/G CR (ref 0–0.2)
RBC # BLD AUTO: 5.25 10E6/UL (ref 4.4–5.9)
RBC URINE: <1 /HPF
SODIUM SERPL-SCNC: 137 MMOL/L (ref 133–144)
SP GR UR STRIP: 1.02 (ref 1–1.03)
SPECIMEN STATUS: NORMAL
TEST NAME: NORMAL
UROBILINOGEN UR STRIP-MCNC: NORMAL MG/DL
WBC # BLD AUTO: 10 10E3/UL (ref 4–11)
WBC URINE: 1 /HPF

## 2021-08-20 PROCEDURE — 85027 COMPLETE CBC AUTOMATED: CPT | Performed by: PATHOLOGY

## 2021-08-20 PROCEDURE — 80069 RENAL FUNCTION PANEL: CPT | Performed by: PATHOLOGY

## 2021-08-20 PROCEDURE — 86255 FLUORESCENT ANTIBODY SCREEN: CPT | Performed by: PATHOLOGY

## 2021-08-20 PROCEDURE — 36415 COLL VENOUS BLD VENIPUNCTURE: CPT | Performed by: PATHOLOGY

## 2021-08-20 PROCEDURE — G0463 HOSPITAL OUTPT CLINIC VISIT: HCPCS

## 2021-08-20 PROCEDURE — 86355 B CELLS TOTAL COUNT: CPT | Mod: 90 | Performed by: PATHOLOGY

## 2021-08-20 PROCEDURE — 83520 IMMUNOASSAY QUANT NOS NONAB: CPT | Performed by: PATHOLOGY

## 2021-08-20 PROCEDURE — 99214 OFFICE O/P EST MOD 30 MIN: CPT | Mod: GC | Performed by: STUDENT IN AN ORGANIZED HEALTH CARE EDUCATION/TRAINING PROGRAM

## 2021-08-20 PROCEDURE — 84156 ASSAY OF PROTEIN URINE: CPT | Performed by: PATHOLOGY

## 2021-08-20 PROCEDURE — 81001 URINALYSIS AUTO W/SCOPE: CPT | Performed by: PATHOLOGY

## 2021-08-20 RX ORDER — LISINOPRIL 20 MG/1
20 TABLET ORAL DAILY
Qty: 30 TABLET | Refills: 11 | Status: SHIPPED | OUTPATIENT
Start: 2021-08-20

## 2021-08-20 RX ORDER — PREDNISONE 5 MG/1
TABLET ORAL
Qty: 150 TABLET | Refills: 0 | Status: SHIPPED | OUTPATIENT
Start: 2021-08-20 | End: 2021-10-19

## 2021-08-20 ASSESSMENT — PAIN SCALES - GENERAL: PAINLEVEL: NO PAIN (0)

## 2021-08-20 ASSESSMENT — MIFFLIN-ST. JEOR: SCORE: 2230.63

## 2021-08-20 NOTE — LETTER
8/20/2021      RE: Alex Etienne  [address] [place] [address]       Nephrology Clinic Follow Up Visit    Assessment and Plan:     # Membranous Nephropathy  # CKD3a  Hx of membranous nephropathy diagnosed on biopsy in 2016, initially treated with tacrolimus and prednisone but then relapsed on taper of tac. He was treated with rituximab (2 doses 2 weeks apart) in Oct/Nov 2020 and was PLA2R positive prior but he did not have follow up with Nephrology after to obtain labs. He does not currently have nephrotic range proteinuria and therefore does not have indication for treatment. We will send anti-PLA2R antibodies to follow for disease activity and plan to check UPCR and antibodies [id number]. In the meantime, will start a very slow taper to get Mr. Etienne off of prednisone.  - taper to 15mg for 1 month, then 10mg for 1 month. Will then go down 1mg at a time  - RTC in 2 months    # HTN  Suboptimal control. Will start lisinopril 20mg daily.    Assessment and plan was discussed with patient and he voiced his understanding and agreement.    Reason for Visit:  Alex Etienne is a 56 year old male with hx of membranous nephropathy, who presents for routine follow up.     HPI:  No changes since last visit on 8/4/21. Pt was able to get labs today and he does not have nephrotic range proteinuria at this time. He remains on prednisone 20mg daily and has been on this dose for years. His breathing is unchanged, no chest pain. He does have abdominal striae.     ROS:  A comprehensive review of systems was obtained and negative, except as noted in the HPI or PMH.    Active Medical Problems:  Patient Active Problem List   Diagnosis     Exertional chest pain     Dyspnea on exertion     Benign essential hypertension     Abnormal screening cardiac CT     Status post coronary angiogram       Personal Hx:   Social History     Tobacco Use     Smoking status: Never Smoker     Smokeless tobacco: Never Used   Substance Use Topics      Alcohol use: Not Currently       Allergies: Reviewed by provider.     Medications:  Current Outpatient Medications   Medication Sig     aspirin (ASA) 81 MG chewable tablet Take 81 mg by mouth     atorvastatin (LIPITOR) 40 MG tablet Take 40 mg by mouth     furosemide (LASIX) 20 MG tablet TAKE 2 TABLETS BY MOUTH TWICE DAILY     furosemide (LASIX) 40 MG tablet Take 1 tablet (40 mg) by mouth daily     isosorbide dinitrate (ISORDIL) 30 MG tablet      isosorbide mononitrate (IMDUR) 30 MG 24 hr tablet Take 30 mg by mouth     lisinopril (ZESTRIL) 20 MG tablet Take 1 tablet (20 mg) by mouth daily     metoprolol succinate ER (TOPROL-XL) 25 MG 24 hr tablet Take 25 mg by mouth     nitroGLYcerin (NITROSTAT) 0.4 MG sublingual tablet Place 0.4 mg under the tongue     potassium chloride ER (K-TAB) 20 MEQ CR tablet Take 1 tablet (20 mEq) by mouth daily     predniSONE (DELTASONE) 5 MG tablet Take 3 tablets (15 mg) by mouth daily for 30 days, THEN 2 tablets (10 mg) daily.     RITUXIMAB-ARRX IV      furosemide (LASIX) 20 MG tablet Take 20 mg by mouth (Patient not taking: Reported on 8/20/2021)     No current facility-administered medications for this visit.       Vitals:  BP (!) 143/89   Pulse 63   Temp 98.3  F (36.8  C) (Oral)   Ht 1.829 m (6')   Wt 135.3 kg (298 lb 3.2 oz)   SpO2 93%   BMI 40.44 kg/m      Exam:  GENERAL APPEARANCE: alert and no distress  HENT: mouth without ulcers or lesions  LYMPHATICS: no cervical or supraclavicular nodes  RESP: lungs clear to auscultation - no rales, rhonchi or wheezes  CV: regular rhythm, normal rate, no rub, no murmur  EDEMA: no LE edema bilaterally  ABDOMEN: soft, nondistended, nontender, bowel sounds normal  MS: extremities normal - no gross deformities noted, no evidence of inflammation in joints, no muscle tenderness  SKIN: abdominal striae present    LABS:   CMP  Recent Labs   Lab Test 08/20/21  1042 06/10/21  0814 06/04/21  1142    139 143   POTASSIUM 3.8 3.8 3.6   CHLORIDE 104  104 109   CO2 25 29 30   ANIONGAP 8 6 4   * 121* 158*   BUN 28 22 20   CR 1.63* 1.50* 1.50*   GFRESTIMATED 47* 52* 52*   GFRESTBLACK  --  60* 60*   JEANIE 9.4 9.0 8.7     No lab results found.  CBC  Recent Labs   Lab Test 08/20/21  1042 06/10/21  0814   HGB 15.0 14.4   WBC 10.0 8.9   RBC 5.25 5.05   HCT 44.7 43.7   MCV 85 87   MCH 28.6 28.5   MCHC 33.6 33.0   RDW 12.9 13.2    254     URINE STUDIES  Recent Labs   Lab Test 08/20/21  1112   COLOR Yellow   APPEARANCE Clear   URINEGLC Negative   URINEBILI Negative   URINEKETONE Negative   SG 1.017   UBLD Small*   URINEPH 5.0   PROTEIN 100 *   NITRITE Negative   LEUKEST Negative   RBCU <1   WBCU 1     Recent Labs   Lab Test 08/20/21  1112   UTPG 0.26*     PTH  No lab results found.  IRON STUDIES  No lab results found.    Anabel Esteban MD    Attestation signed by Hannah Tolentino MD at 9/27/2021 12:13 PM:  I have seen and evaluated the patient. Discussed with the fellow and agree with fellow's findings and plan as documented in the fellow's note.  I have reviewed today's vital signs, notes, medications, labs and imaging.   Hannah Tolentino MD, MD Anabel Esteban MD

## 2021-08-20 NOTE — NURSING NOTE
Chief Complaint   Patient presents with     RECHECK     Benign essential hypertension      BP (!) 143/89   Pulse 63   Temp 98.3  F (36.8  C) (Oral)   Ht 1.829 m (6')   Wt 135.3 kg (298 lb 3.2 oz)   SpO2 93%   BMI 40.44 kg/m       Meryl Suarez MA

## 2021-08-20 NOTE — LETTER
8/20/2021       RE: Alex Etienne  [address] [place] [address]     Dear Colleague,    Thank you for referring your patient, Alex Etienne, to the [facility]PHROLOG[facility] [place] at [facility]. Please see a copy of my visit note below.    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 He was treated with rituximab (2 doses 2 weeks apart) in Oct/Nov 2020 and was PLA2R positive prior but he did not have follow up with Nephrology after to obtain labs. He does not currently have nephrotic range proteinuria and therefore does not have indication for treatment. We will send anti-PLA2R antibodies to follow for disease activity and plan to check UPCR and antibodies [id number]. In the meantime, will start a very slow taper to get Mr. Etienne off of prednisone.  - taper to 15mg for 1 month, then 10mg for 1 month. Will then go down 1mg at a time  - RTC in 2 months    # HTN  Suboptimal control. Will start lisinopril 20mg daily.    Assessment and plan was discussed with patient and he voiced his understanding and agreement.    Reason for Visit:  Alex Etienne is a 56 year old male with hx of membranous nephropathy, who presents for routine follow up.     HPI:  No changes since last visit on 8/4/21. Pt was able to get labs today and he does not have nephrotic range proteinuria at this time. He remains on prednisone 20mg daily and has been on this dose for years. His breathing is unchanged, no chest pain. He does have abdominal striae.     ROS:  A comprehensive review of systems was obtained and negative, except as noted in the HPI or PMH.    Active Medical Problems:  Patient Active Problem List   Diagnosis     Exertional chest pain     Dyspnea on exertion     Benign  essential hypertension     Abnormal screening cardiac CT     Status post coronary angiogram       Personal Hx:   Social History     Tobacco Use     Smoking status: Never Smoker     Smokeless tobacco: Never Used   Substance Use Topics     Alcohol use: Not Currently       Allergies: Reviewed by provider.     Medications:  Current Outpatient Medications   Medication Sig     aspirin (ASA) 81 MG chewable tablet Take 81 mg by mouth     atorvastatin (LIPITOR) 40 MG tablet Take 40 mg by mouth     furosemide (LASIX) 20 MG tablet TAKE 2 TABLETS BY MOUTH TWICE DAILY     furosemide (LASIX) 40 MG tablet Take 1 tablet (40 mg) by mouth daily     isosorbide dinitrate (ISORDIL) 30 MG tablet      isosorbide mononitrate (IMDUR) 30 MG 24 hr tablet Take 30 mg by mouth     lisinopril (ZESTRIL) 20 MG tablet Take 1 tablet (20 mg) by mouth daily     metoprolol succinate ER (TOPROL-XL) 25 MG 24 hr tablet Take 25 mg by mouth     nitroGLYcerin (NITROSTAT) 0.4 MG sublingual tablet Place 0.4 mg under the tongue     potassium chloride ER (K-TAB) 20 MEQ CR tablet Take 1 tablet (20 mEq) by mouth daily     predniSONE (DELTASONE) 5 MG tablet Take 3 tablets (15 mg) by mouth daily for 30 days, THEN 2 tablets (10 mg) daily.     RITUXIMAB-ARRX IV      furosemide (LASIX) 20 MG tablet Take 20 mg by mouth (Patient not taking: Reported on 8/20/2021)     No current facility-administered medications for this visit.       Vitals:  BP (!) 143/89   Pulse 63   Temp 98.3  F (36.8  C) (Oral)   Ht 1.829 m (6')   Wt 135.3 kg (298 lb 3.2 oz)   SpO2 93%   BMI 40.44 kg/m      Exam:  GENERAL APPEARANCE: alert and no distress  HENT: mouth without ulcers or lesions  LYMPHATICS: no cervical or supraclavicular nodes  RESP: lungs clear to auscultation - no rales, rhonchi or wheezes  CV: regular rhythm, normal rate, no rub, no murmur  EDEMA: no LE edema bilaterally  ABDOMEN: soft, nondistended, nontender, bowel sounds normal  MS: extremities normal - no gross deformities  noted, no evidence of inflammation in joints, no muscle tenderness  SKIN: abdominal striae present    LABS:   CMP  Recent Labs   Lab Test 08/20/21  1042 06/10/21  0814 06/04/21  1142    139 143   POTASSIUM 3.8 3.8 3.6   CHLORIDE 104 104 109   CO2 25 29 30   ANIONGAP 8 6 4   * 121* 158*   BUN 28 22 20   CR 1.63* 1.50* 1.50*   GFRESTIMATED 47* 52* 52*   GFRESTBLACK  --  60* 60*   JEANIE 9.4 9.0 8.7     No lab results found.  CBC  Recent Labs   Lab Test 08/20/21  1042 06/10/21  0814   HGB 15.0 14.4   WBC 10.0 8.9   RBC 5.25 5.05   HCT 44.7 43.7   MCV 85 87   MCH 28.6 28.5   MCHC 33.6 33.0   RDW 12.9 13.2    254     URINE STUDIES  Recent Labs   Lab Test 08/20/21  1112   COLOR Yellow   APPEARANCE Clear   URINEGLC Negative   URINEBILI Negative   URINEKETONE Negative   SG 1.017   UBLD Small*   URINEPH 5.0   PROTEIN 100 *   NITRITE Negative   LEUKEST Negative   RBCU <1   WBCU 1     Recent Labs   Lab Test 08/20/21  1112   UTPG 0.26*     PTH  No lab results found.  IRON STUDIES  No lab results found.    Anabel Esteban MD    Attestation signed by Hannah Tolentino MD at 9/27/2021 12:13 PM:  I have seen and evaluated the patient. Discussed with the fellow and agree with fellow's findings and plan as documented in the fellow's note.  I have reviewed today's vital signs, notes, medications, labs and imaging.   Hannah Tolentino MD, MD         Again, thank you for allowing me to participate in the care of your patient.      Sincerely,    Anabel Esteban MD

## 2021-08-21 LAB
CD19 CELLS # BLD: 3 CELLS/UL (ref 107–698)
CD19 CELLS NFR BLD: <1 % (ref 6–27)

## 2021-09-03 DIAGNOSIS — N02.2 MEMBRANOUS NEPHROPATHY DETERMINED BY BIOPSY: Primary | ICD-10-CM

## 2021-09-03 RX ORDER — ATORVASTATIN CALCIUM 40 MG/1
40 TABLET, FILM COATED ORAL DAILY
Qty: 90 TABLET | Refills: 3 | Status: SHIPPED | OUTPATIENT
Start: 2021-09-03

## 2021-09-03 NOTE — PROGRESS NOTES
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 He was treated with rituximab (2 doses 2 weeks apart) in Oct/Nov 2020 and was PLA2R positive prior but he did not have follow up with Nephrology after to obtain labs. He does not currently have nephrotic range proteinuria and therefore does not have indication for treatment. We will send anti-PLA2R antibodies to follow for disease activity and plan to check UPCR and antibodies [id number]. In the meantime, will start a very slow taper to get Mr. Etienne off of prednisone.  - taper to 15mg for 1 month, then 10mg for 1 month. Will then go down 1mg at a time  - RTC in 2 months    # HTN  Suboptimal control. Will start lisinopril 20mg daily.    Assessment and plan was discussed with patient and he voiced his understanding and agreement.    Reason for Visit:  Alex Etienne is a 56 year old male with hx of membranous nephropathy, who presents for routine follow up.     HPI:  No changes since last visit on 8/4/21. Pt was able to get labs today and he does not have nephrotic range proteinuria at this time. He remains on prednisone 20mg daily and has been on this dose for years. His breathing is unchanged, no chest pain. He does have abdominal striae.     ROS:  A comprehensive review of systems was obtained and negative, except as noted in the HPI or PMH.    Active Medical Problems:  Patient Active Problem List   Diagnosis     Exertional chest pain     Dyspnea on exertion     Benign essential hypertension     Abnormal screening cardiac CT     Status post coronary angiogram       Personal Hx:   Social History     Tobacco Use     Smoking status: Never Smoker     Smokeless tobacco: Never Used   Substance Use Topics     Alcohol use: Not Currently       Allergies: Reviewed by provider.      Medications:  Current Outpatient Medications   Medication Sig     aspirin (ASA) 81 MG chewable tablet Take 81 mg by mouth     atorvastatin (LIPITOR) 40 MG tablet Take 40 mg by mouth     furosemide (LASIX) 20 MG tablet TAKE 2 TABLETS BY MOUTH TWICE DAILY     furosemide (LASIX) 40 MG tablet Take 1 tablet (40 mg) by mouth daily     isosorbide dinitrate (ISORDIL) 30 MG tablet      isosorbide mononitrate (IMDUR) 30 MG 24 hr tablet Take 30 mg by mouth     lisinopril (ZESTRIL) 20 MG tablet Take 1 tablet (20 mg) by mouth daily     metoprolol succinate ER (TOPROL-XL) 25 MG 24 hr tablet Take 25 mg by mouth     nitroGLYcerin (NITROSTAT) 0.4 MG sublingual tablet Place 0.4 mg under the tongue     potassium chloride ER (K-TAB) 20 MEQ CR tablet Take 1 tablet (20 mEq) by mouth daily     predniSONE (DELTASONE) 5 MG tablet Take 3 tablets (15 mg) by mouth daily for 30 days, THEN 2 tablets (10 mg) daily.     RITUXIMAB-ARRX IV      furosemide (LASIX) 20 MG tablet Take 20 mg by mouth (Patient not taking: Reported on 8/20/2021)     No current facility-administered medications for this visit.       Vitals:  BP (!) 143/89   Pulse 63   Temp 98.3  F (36.8  C) (Oral)   Ht 1.829 m (6')   Wt 135.3 kg (298 lb 3.2 oz)   SpO2 93%   BMI 40.44 kg/m      Exam:  GENERAL APPEARANCE: alert and no distress  HENT: mouth without ulcers or lesions  LYMPHATICS: no cervical or supraclavicular nodes  RESP: lungs clear to auscultation - no rales, rhonchi or wheezes  CV: regular rhythm, normal rate, no rub, no murmur  EDEMA: no LE edema bilaterally  ABDOMEN: soft, nondistended, nontender, bowel sounds normal  MS: extremities normal - no gross deformities noted, no evidence of inflammation in joints, no muscle tenderness  SKIN: abdominal striae present    LABS:   CMP  Recent Labs   Lab Test 08/20/21  1042 06/10/21  0814 06/04/21  1142    139 143   POTASSIUM 3.8 3.8 3.6   CHLORIDE 104 104 109   CO2 25 29 30   ANIONGAP 8 6 4   * 121* 158*    BUN 28 22 20   CR 1.63* 1.50* 1.50*   GFRESTIMATED 47* 52* 52*   GFRESTBLACK  --  60* 60*   JEANIE 9.4 9.0 8.7     No lab results found.  CBC  Recent Labs   Lab Test 08/20/21  1042 06/10/21  0814   HGB 15.0 14.4   WBC 10.0 8.9   RBC 5.25 5.05   HCT 44.7 43.7   MCV 85 87   MCH 28.6 28.5   MCHC 33.6 33.0   RDW 12.9 13.2    254     URINE STUDIES  Recent Labs   Lab Test 08/20/21  1112   COLOR Yellow   APPEARANCE Clear   URINEGLC Negative   URINEBILI Negative   URINEKETONE Negative   SG 1.017   UBLD Small*   URINEPH 5.0   PROTEIN 100 *   NITRITE Negative   LEUKEST Negative   RBCU <1   WBCU 1     Recent Labs   Lab Test 08/20/21  1112   UTPG 0.26*     PTH  No lab results found.  IRON STUDIES  No lab results found.    Anabel Esteban MD

## 2021-10-14 LAB — MAYO MISC RESULT: NORMAL

## 2021-10-17 ENCOUNTER — HEALTH MAINTENANCE LETTER (OUTPATIENT)
Age: 56
End: 2021-10-17

## 2021-10-25 DIAGNOSIS — N02.2 MEMBRANOUS NEPHROPATHY DETERMINED BY BIOPSY: Primary | ICD-10-CM

## 2021-10-26 ENCOUNTER — LAB (OUTPATIENT)
Dept: LAB | Facility: CLINIC | Age: 56
End: 2021-10-26
Payer: COMMERCIAL

## 2021-10-26 ENCOUNTER — TELEPHONE (OUTPATIENT)
Dept: NEPHROLOGY | Facility: CLINIC | Age: 56
End: 2021-10-26

## 2021-10-26 DIAGNOSIS — N02.2 MEMBRANOUS NEPHROPATHY DETERMINED BY BIOPSY: ICD-10-CM

## 2021-10-26 LAB
ALBUMIN MFR UR ELPH: 41.8 MG/DL
ALBUMIN SERPL-MCNC: 3.9 G/DL (ref 3.5–5)
ALBUMIN UR-MCNC: 100 MG/DL
ANION GAP SERPL CALCULATED.3IONS-SCNC: 11 MMOL/L (ref 5–18)
APPEARANCE UR: CLEAR
BILIRUB UR QL STRIP: NEGATIVE
BUN SERPL-MCNC: 22 MG/DL (ref 8–22)
CALCIUM SERPL-MCNC: 9.3 MG/DL (ref 8.5–10.5)
CHLORIDE BLD-SCNC: 103 MMOL/L (ref 98–107)
CO2 SERPL-SCNC: 26 MMOL/L (ref 22–31)
COLOR UR AUTO: YELLOW
CREAT SERPL-MCNC: 1.7 MG/DL (ref 0.7–1.3)
CREAT UR-MCNC: 310 MG/DL
ERYTHROCYTE [DISTWIDTH] IN BLOOD BY AUTOMATED COUNT: 13.2 % (ref 10–15)
GFR SERPL CREATININE-BSD FRML MDRD: 45 ML/MIN/1.73M2
GLUCOSE BLD-MCNC: 108 MG/DL (ref 70–125)
GLUCOSE UR STRIP-MCNC: NEGATIVE MG/DL
HCT VFR BLD AUTO: 40.5 % (ref 40–53)
HGB BLD-MCNC: 13.4 G/DL (ref 13.3–17.7)
HGB UR QL STRIP: ABNORMAL
KETONES UR STRIP-MCNC: NEGATIVE MG/DL
LEUKOCYTE ESTERASE UR QL STRIP: NEGATIVE
MCH RBC QN AUTO: 28.3 PG (ref 26.5–33)
MCHC RBC AUTO-ENTMCNC: 33.1 G/DL (ref 31.5–36.5)
MCV RBC AUTO: 85 FL (ref 78–100)
NITRATE UR QL: NEGATIVE
PH UR STRIP: 5.5 [PH] (ref 5–8)
PHOSPHATE SERPL-MCNC: 2.7 MG/DL (ref 2.5–4.5)
PLATELET # BLD AUTO: 250 10E3/UL (ref 150–450)
POTASSIUM BLD-SCNC: 4.1 MMOL/L (ref 3.5–5)
PROT/CREAT 24H UR: 0.13 MG/MG CR
PTH-INTACT SERPL-MCNC: 130 PG/ML (ref 10–86)
RBC # BLD AUTO: 4.74 10E6/UL (ref 4.4–5.9)
RBC #/AREA URNS AUTO: NORMAL /HPF
SODIUM SERPL-SCNC: 140 MMOL/L (ref 136–145)
SP GR UR STRIP: 1.02 (ref 1–1.03)
UROBILINOGEN UR STRIP-ACNC: 0.2 E.U./DL
WBC # BLD AUTO: 6.3 10E3/UL (ref 4–11)
WBC #/AREA URNS AUTO: NORMAL /HPF

## 2021-10-26 PROCEDURE — 82306 VITAMIN D 25 HYDROXY: CPT

## 2021-10-26 PROCEDURE — 36415 COLL VENOUS BLD VENIPUNCTURE: CPT

## 2021-10-26 PROCEDURE — 83970 ASSAY OF PARATHORMONE: CPT

## 2021-10-26 PROCEDURE — 84156 ASSAY OF PROTEIN URINE: CPT

## 2021-10-26 PROCEDURE — 81001 URINALYSIS AUTO W/SCOPE: CPT

## 2021-10-26 PROCEDURE — 80069 RENAL FUNCTION PANEL: CPT

## 2021-10-26 PROCEDURE — 85027 COMPLETE CBC AUTOMATED: CPT

## 2021-10-26 NOTE — TELEPHONE ENCOUNTER
Writer left message with patient regarding scheduled appointment for tomorrow that is inappropriately scheduled as an in person when Dr. Esteban is here on Wednesdays as virtual. Left voice message for patient informing him of this and that we would need to schedule this as virtual or reschedule for in person if need. Provided number for call back.  Marilyn Lopes LPN  Nephrology  [phone]

## 2021-10-27 ENCOUNTER — VIRTUAL VISIT (OUTPATIENT)
Dept: NEPHROLOGY | Facility: CLINIC | Age: 56
End: 2021-10-27
Attending: STUDENT IN AN ORGANIZED HEALTH CARE EDUCATION/TRAINING PROGRAM
Payer: COMMERCIAL

## 2021-10-27 DIAGNOSIS — N02.2 MEMBRANOUS NEPHROPATHY DETERMINED BY BIOPSY: ICD-10-CM

## 2021-10-27 DIAGNOSIS — E87.70 HYPERVOLEMIA ASSOCIATED WITH RENAL INSUFFICIENCY: Primary | ICD-10-CM

## 2021-10-27 DIAGNOSIS — N18.31 CHRONIC KIDNEY DISEASE, STAGE 3A (H): ICD-10-CM

## 2021-10-27 DIAGNOSIS — N28.9 HYPERVOLEMIA ASSOCIATED WITH RENAL INSUFFICIENCY: Primary | ICD-10-CM

## 2021-10-27 LAB — DEPRECATED CALCIDIOL+CALCIFEROL SERPL-MC: 15 UG/L (ref 30–80)

## 2021-10-27 PROCEDURE — 99214 OFFICE O/P EST MOD 30 MIN: CPT | Mod: 95 | Performed by: STUDENT IN AN ORGANIZED HEALTH CARE EDUCATION/TRAINING PROGRAM

## 2021-10-27 PROCEDURE — G0463 HOSPITAL OUTPT CLINIC VISIT: HCPCS | Mod: PN,RTG | Performed by: STUDENT IN AN ORGANIZED HEALTH CARE EDUCATION/TRAINING PROGRAM

## 2021-10-27 RX ORDER — MEPERIDINE HYDROCHLORIDE 25 MG/ML
25 INJECTION INTRAMUSCULAR; INTRAVENOUS; SUBCUTANEOUS EVERY 30 MIN PRN
Status: CANCELLED | OUTPATIENT
Start: 2021-11-15

## 2021-10-27 RX ORDER — EPINEPHRINE 1 MG/ML
0.3 INJECTION, SOLUTION, CONCENTRATE INTRAVENOUS EVERY 5 MIN PRN
Status: CANCELLED | OUTPATIENT
Start: 2021-11-15

## 2021-10-27 RX ORDER — DIPHENHYDRAMINE HYDROCHLORIDE 50 MG/ML
50 INJECTION INTRAMUSCULAR; INTRAVENOUS
Status: CANCELLED
Start: 2021-11-15

## 2021-10-27 RX ORDER — TORSEMIDE 20 MG/1
40 TABLET ORAL DAILY
Qty: 60 TABLET | Refills: 11 | Status: SHIPPED | OUTPATIENT
Start: 2021-10-27

## 2021-10-27 RX ORDER — METHYLPREDNISOLONE SODIUM SUCCINATE 125 MG/2ML
125 INJECTION, POWDER, LYOPHILIZED, FOR SOLUTION INTRAMUSCULAR; INTRAVENOUS
Status: CANCELLED
Start: 2021-11-15

## 2021-10-27 RX ORDER — ALBUTEROL SULFATE 0.83 MG/ML
2.5 SOLUTION RESPIRATORY (INHALATION)
Status: CANCELLED | OUTPATIENT
Start: 2021-11-15

## 2021-10-27 RX ORDER — NALOXONE HYDROCHLORIDE 0.4 MG/ML
0.2 INJECTION, SOLUTION INTRAMUSCULAR; INTRAVENOUS; SUBCUTANEOUS
Status: CANCELLED | OUTPATIENT
Start: 2021-11-15

## 2021-10-27 RX ORDER — PREDNISONE 5 MG/1
TABLET ORAL
Qty: 33 TABLET | Refills: 0 | Status: SHIPPED | OUTPATIENT
Start: 2021-10-27 | End: 2021-12-14

## 2021-10-27 RX ORDER — COSYNTROPIN 0.25 MG/ML
250 INJECTION, POWDER, FOR SOLUTION INTRAMUSCULAR; INTRAVENOUS ONCE
Status: CANCELLED
Start: 2021-11-15 | End: 2021-11-15

## 2021-10-27 RX ORDER — ALBUTEROL SULFATE 90 UG/1
1-2 AEROSOL, METERED RESPIRATORY (INHALATION)
Status: CANCELLED
Start: 2021-11-15

## 2021-10-27 NOTE — PATIENT INSTRUCTIONS
Continue prednisone 5mg daily until 11/14/21    On 11/15/21 start prednisone 2.5mg daily    Stop taking furosemide    Start torsemide 40mg daily    We will schedule you for an Adrenal Stim Test after you have reduced your prednisone to 2.5mg daily (after Nov 15)    Please try to monitor your blood pressure at least a few times per week - goal BP is <130/80    Follow up with me in 3 months

## 2021-10-27 NOTE — LETTER
10/27/2021     RE: Alex Etienne  [address] [place] [address]     Dear Colleague,    Thank you for referring your patient, Alex Etienne, to the [facility] [place] at [facility]. Please see a copy of my visit note below.    Alex is a 56 year old who is being evaluated via a billable video visit.      How would you like to obtain your AVS? MyChart  If the video visit is dropped, the invitation should be resent by: Text to cell phone: [phone]  Will anyone else be joining your video visit? No    Video Start Time: 4:13PM  Video-Visit Details    Type of service:  Video Visit    Video End Time:4:48PM    Originating Location (pt. Location): Home    Distant Location (provider location):  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 He was treated with rituximab (2 doses 2 weeks apart) in Oct/Nov 2020 and was PLA2R positive prior but he did not follow up with Nephrology after to obtain labs. He does not currently have nephrotic range proteinuria and PLA2R antibodies are negative and therefore does not have indication for treatment. We have started a prednisone taper and he is currently on 5mg daily. We recommend continuing this for 2 more weeks until 11/14 then decreasing to 2.5mg daily. At that time, we will do a cosyntropin stim test to see if he can come off the prednisone. Additionally, he feels he is still retaining fluid - will change diuretic to torsemide 40mg daily.  -Pred 5mg daily until 11/14 then 2.5mg daily  - Cosyntropin stim test after decrease to 2.5mg  - Stop furosemide, start torsemide 40mg daily  - RTC in 3 months -  will repeat UPCR and PLA2R antibodies at that time     # HTN  Not checked at home. Continue lisinopril 20mg daily.    Assessment and plan was discussed with patient and he voiced his understanding and agreement.    I discussed the patient's plan of care with Dr. Tillman.    Sherita Esteban MD  Nephrology Fellow    Reason for Visit:  Alex Etienne is a 56 year old male with hx of membranous nephropathy in remission, who presents for routine follow up.     HPI:  At last visit we started a prednisone taper. He went from 15mg for 30 days to 10mg for 30 days and reports he's currently taking 5mg. No lightheadedness, dizziness, or fatigue. His weight has been stable and he's frustrated he hasn't been able to lose any weight despite cutting down on the prednisone. He is currently taking lasix 40mg daily. He continues to try to be active but is limited by SOB and lower leg pain which he describes as similar to shin splints. He does not check his BP at home.      Home BP: Not checked    ROS:  A comprehensive review of systems was obtained and negative, except as noted in the HPI or PMH.    Active Medical Problems:  Patient Active Problem List   Diagnosis     Exertional chest pain     Dyspnea on exertion     Benign essential hypertension     Abnormal screening cardiac CT     Status post coronary angiogram     Membranous nephropathy determined by biopsy       Personal Hx:   Social History     Tobacco Use     Smoking status: Never Smoker     Smokeless tobacco: Never Used   Substance Use Topics     Alcohol use: Not Currently       Allergies: Reviewed by provider.     Medications:  Current Outpatient Medications   Medication Sig     aspirin (ASA) 81 MG chewable tablet Take 81 mg by mouth     atorvastatin (LIPITOR) 40 MG tablet Take 1 tablet (40 mg) by mouth daily     isosorbide dinitrate (ISORDIL) 30 MG tablet      isosorbide mononitrate (IMDUR) 30 MG 24 hr tablet Take 30 mg by mouth     lisinopril (ZESTRIL) 20 MG tablet Take 1  tablet (20 mg) by mouth daily     metoprolol succinate ER (TOPROL-XL) 25 MG 24 hr tablet Take 25 mg by mouth     nitroGLYcerin (NITROSTAT) 0.4 MG sublingual tablet Place 0.4 mg under the tongue     potassium chloride ER (K-TAB) 20 MEQ CR tablet Take 1 tablet (20 mEq) by mouth daily     predniSONE (DELTASONE) 5 MG tablet Take 1 tablet (5 mg) by mouth daily for 18 days, THEN 0.5 tablets (2.5 mg) daily. Reduce dose to 0.5 tablets (2.5mg) on 11/15/21     RITUXIMAB-ARRX IV      torsemide (DEMADEX) 20 MG tablet Take 2 tablets (40 mg) by mouth daily     No current facility-administered medications for this visit.       Vitals:  There were no vitals taken for this visit.    Exam:  GEN: comfortable, NAD  RESP: no increased WOB    LABS:   CMP  Recent Labs   Lab Test 10/26/21  0934 08/20/21  1042 06/10/21  0814 06/04/21  1142    137 139 143   POTASSIUM 4.1 3.8 3.8 3.6   CHLORIDE 103 104 104 109   CO2 26 25 29 30   ANIONGAP 11 8 6 4    108* 121* 158*   BUN 22 28 22 20   CR 1.70* 1.63* 1.50* 1.50*   GFRESTIMATED 45* 47* 52* 52*   GFRESTBLACK  --   --  60* 60*   JEANIE 9.3 9.4 9.0 8.7     No lab results found.  CBC  Recent Labs   Lab Test 10/26/21  0934 08/20/21  1042 06/10/21  0814   HGB 13.4 15.0 14.4   WBC 6.3 10.0 8.9   RBC 4.74 5.25 5.05   HCT 40.5 44.7 43.7   MCV 85 85 87   MCH 28.3 28.6 28.5   MCHC 33.1 33.6 33.0   RDW 13.2 12.9 13.2    250 254     URINE STUDIES  Recent Labs   Lab Test 10/26/21  0943 08/20/21  1112   COLOR Yellow Yellow   APPEARANCE Clear Clear   URINEGLC Negative Negative   URINEBILI Negative Negative   URINEKETONE Negative Negative   SG 1.025 1.017   UBLD Trace* Small*   URINEPH 5.5 5.0   PROTEIN 100 * 100 *   UROBILINOGEN 0.2  --    NITRITE Negative Negative   LEUKEST Negative Negative   RBCU 0-2 <1   WBCU None Seen 1     Recent Labs   Lab Test 08/20/21  1112   UTPG 0.26*     PTH  Recent Labs   Lab Test 10/26/21  0934   PTHI 130*     IRON STUDIES  No lab results found.    Anabel Castro  MD [place]    Attestation signed by Winnie Tillman MD at 12/6/2021 10:57 AM:  Attestation:  Alex Etienne has been evaluated and examined by me, Winnie Tillman MD.  Discussed with the fellow or resident and agree with the findings and plan in this note.  I have reviewed Medications, Vital Signs, and Labs as well as provider notes.    Date of Service (when I saw the patient): 10/27/2021    I was on the Video 448 pm- 457    Winnie Tillman MD  [facility]  Department of Medicine  Division of Renal Disease and Hypertension  [facility]  [facility] Web Console

## 2021-10-27 NOTE — PROGRESS NOTES
Alex is a 56 year old who is being evaluated via a billable video visit.      How would you like to obtain your AVS? [place]  If the video visit is dropped, the invitation should be resent by: Text to cell phone: [phone]  Will anyone else be joining your video visit? No    Video Start Time: 4:13PM  Video-Visit Details    Type of service:  Video Visit    Video End Time:4:48PM    Originating Location (pt. 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 He was treated with rituximab (2 doses 2 weeks apart) in Oct/Nov 2020 and was PLA2R positive prior but he did not follow up with Nephrology after to obtain labs. He does not currently have nephrotic range proteinuria and PLA2R antibodies are negative and therefore does not have indication for treatment. We have started a prednisone taper and he is currently on 5mg daily. We recommend continuing this for 2 more weeks until 11/14 then decreasing to 2.5mg daily. At that time, we will do a cosyntropin stim test to see if he can come off the prednisone. Additionally, he feels he is still retaining fluid - will change diuretic to torsemide 40mg daily.  -Pred 5mg daily until 11/14 then 2.5mg daily  - Cosyntropin stim test after decrease to 2.5mg  - Stop furosemide, start torsemide 40mg daily  - RTC in 3 months - will repeat UPCR and PLA2R antibodies at that time     # HTN  Not checked at home. Continue lisinopril 20mg daily.    Assessment and plan was discussed with patient and he voiced his understanding and agreement.    I discussed the patient's plan of care with Dr. Tillman.    Sherita Esteban MD  Nephrology  Fellow    Reason for Visit:  Alex Etienne is a 56 year old male with hx of membranous nephropathy in remission, who presents for routine follow up.     HPI:  At last visit we started a prednisone taper. He went from 15mg for 30 days to 10mg for 30 days and reports he's currently taking 5mg. No lightheadedness, dizziness, or fatigue. His weight has been stable and he's frustrated he hasn't been able to lose any weight despite cutting down on the prednisone. He is currently taking lasix 40mg daily. He continues to try to be active but is limited by SOB and lower leg pain which he describes as similar to shin splints. He does not check his BP at home.      Home BP: Not checked    ROS:  A comprehensive review of systems was obtained and negative, except as noted in the HPI or PMH.    Active Medical Problems:  Patient Active Problem List   Diagnosis     Exertional chest pain     Dyspnea on exertion     Benign essential hypertension     Abnormal screening cardiac CT     Status post coronary angiogram     Membranous nephropathy determined by biopsy       Personal Hx:   Social History     Tobacco Use     Smoking status: Never Smoker     Smokeless tobacco: Never Used   Substance Use Topics     Alcohol use: Not Currently       Allergies: Reviewed by provider.     Medications:  Current Outpatient Medications   Medication Sig     aspirin (ASA) 81 MG chewable tablet Take 81 mg by mouth     atorvastatin (LIPITOR) 40 MG tablet Take 1 tablet (40 mg) by mouth daily     isosorbide dinitrate (ISORDIL) 30 MG tablet      isosorbide mononitrate (IMDUR) 30 MG 24 hr tablet Take 30 mg by mouth     lisinopril (ZESTRIL) 20 MG tablet Take 1 tablet (20 mg) by mouth daily     metoprolol succinate ER (TOPROL-XL) 25 MG 24 hr tablet Take 25 mg by mouth     nitroGLYcerin (NITROSTAT) 0.4 MG sublingual tablet Place 0.4 mg under the tongue     potassium chloride ER (K-TAB) 20 MEQ CR tablet Take 1 tablet (20 mEq) by mouth daily     predniSONE  (DELTASONE) 5 MG tablet Take 1 tablet (5 mg) by mouth daily for 18 days, THEN 0.5 tablets (2.5 mg) daily. Reduce dose to 0.5 tablets (2.5mg) on 11/15/21     RITUXIMAB-ARRX IV      torsemide (DEMADEX) 20 MG tablet Take 2 tablets (40 mg) by mouth daily     No current facility-administered medications for this visit.       Vitals:  There were no vitals taken for this visit.    Exam:  GEN: comfortable, NAD  RESP: no increased WOB    LABS:   CMP  Recent Labs   Lab Test 10/26/21  0934 08/20/21  1042 06/10/21  0814 06/04/21  1142    137 139 143   POTASSIUM 4.1 3.8 3.8 3.6   CHLORIDE 103 104 104 109   CO2 26 25 29 30   ANIONGAP 11 8 6 4    108* 121* 158*   BUN 22 28 22 20   CR 1.70* 1.63* 1.50* 1.50*   GFRESTIMATED 45* 47* 52* 52*   GFRESTBLACK  --   --  60* 60*   JEANIE 9.3 9.4 9.0 8.7     No lab results found.  CBC  Recent Labs   Lab Test 10/26/21  0934 08/20/21  1042 06/10/21  0814   HGB 13.4 15.0 14.4   WBC 6.3 10.0 8.9   RBC 4.74 5.25 5.05   HCT 40.5 44.7 43.7   MCV 85 85 87   MCH 28.3 28.6 28.5   MCHC 33.1 33.6 33.0   RDW 13.2 12.9 13.2    250 254     URINE STUDIES  Recent Labs   Lab Test 10/26/21  0943 08/20/21  1112   COLOR Yellow Yellow   APPEARANCE Clear Clear   URINEGLC Negative Negative   URINEBILI Negative Negative   URINEKETONE Negative Negative   SG 1.025 1.017   UBLD Trace* Small*   URINEPH 5.5 5.0   PROTEIN 100 * 100 *   UROBILINOGEN 0.2  --    NITRITE Negative Negative   LEUKEST Negative Negative   RBCU 0-2 <1   WBCU None Seen 1     Recent Labs   Lab Test 08/20/21  1112   UTPG 0.26*     PTH  Recent Labs   Lab Test 10/26/21  0934   PTHI 130*     IRON STUDIES  No lab results found.    Anabel Esteban MD

## 2021-12-02 ENCOUNTER — TELEPHONE (OUTPATIENT)
Dept: NEPHROLOGY | Facility: CLINIC | Age: 56
End: 2021-12-02
Payer: COMMERCIAL

## 2021-12-02 NOTE — TELEPHONE ENCOUNTER
Call to patient regarding my chart message as he reports that he wasn't aware and didn't get a call to schedule this. Writer apologized as this wuold have typically come through the nurses to help arrange but might have dropped to a que and was scheduled. Writer cannot speak to why he wasn't called but apologized. Patient was not happy but wanted to keep appointment as he has rescheduled with his schedule and would rather get this done. Patient also reports this is the 3rd time he encountered an appointment that was scheduled without his knowing.   Writer will bring that to management.  Patient was also has a COVID test but there was no appointment scheduled for that either. Writer sent a message High priority to [facility] to inform them with hopes patient can keep his appointment for tomorrow at 8 am.  Marilyn Lopes LPN  Nephrology  [phone]

## 2021-12-03 ENCOUNTER — INFUSION THERAPY VISIT (OUTPATIENT)
Dept: INFUSION THERAPY | Facility: CLINIC | Age: 56
End: 2021-12-03
Attending: INTERNAL MEDICINE
Payer: COMMERCIAL

## 2021-12-03 VITALS
RESPIRATION RATE: 16 BRPM | TEMPERATURE: 98.5 F | OXYGEN SATURATION: 97 % | DIASTOLIC BLOOD PRESSURE: 72 MMHG | HEART RATE: 81 BPM | SYSTOLIC BLOOD PRESSURE: 127 MMHG

## 2021-12-03 DIAGNOSIS — N02.2 MEMBRANOUS NEPHROPATHY DETERMINED BY BIOPSY: Primary | ICD-10-CM

## 2021-12-03 LAB
ANION GAP SERPL CALCULATED.3IONS-SCNC: <1 MMOL/L (ref 3–14)
CHLORIDE BLD-SCNC: 107 MMOL/L (ref 94–109)
CO2 SERPL-SCNC: 27 MMOL/L (ref 20–32)
CORTICOSTER 1H P 250 UG ACTH SERPL-SCNC: 21.9 UG/DL (ref 20–150)
CORTICOSTER 30M P 250 UG ACTH SERPL-SCNC: 18.7 UG/DL (ref 20–150)
CORTICOSTER SERPL-MCNC: 9.2 UG/DL (ref 4–22)
POTASSIUM BLD-SCNC: 3.6 MMOL/L (ref 3.4–5.3)
SODIUM SERPL-SCNC: 134 MMOL/L (ref 133–144)
TIME OF COSYNTROPIN INJECTION: NORMAL

## 2021-12-03 PROCEDURE — 84244 ASSAY OF RENIN: CPT | Performed by: STUDENT IN AN ORGANIZED HEALTH CARE EDUCATION/TRAINING PROGRAM

## 2021-12-03 PROCEDURE — 82024 ASSAY OF ACTH: CPT | Performed by: STUDENT IN AN ORGANIZED HEALTH CARE EDUCATION/TRAINING PROGRAM

## 2021-12-03 PROCEDURE — 82374 ASSAY BLOOD CARBON DIOXIDE: CPT | Performed by: STUDENT IN AN ORGANIZED HEALTH CARE EDUCATION/TRAINING PROGRAM

## 2021-12-03 PROCEDURE — 82533 TOTAL CORTISOL: CPT | Performed by: STUDENT IN AN ORGANIZED HEALTH CARE EDUCATION/TRAINING PROGRAM

## 2021-12-03 PROCEDURE — 36415 COLL VENOUS BLD VENIPUNCTURE: CPT | Performed by: STUDENT IN AN ORGANIZED HEALTH CARE EDUCATION/TRAINING PROGRAM

## 2021-12-03 PROCEDURE — 96374 THER/PROPH/DIAG INJ IV PUSH: CPT

## 2021-12-03 PROCEDURE — 250N000011 HC RX IP 250 OP 636: Performed by: STUDENT IN AN ORGANIZED HEALTH CARE EDUCATION/TRAINING PROGRAM

## 2021-12-03 PROCEDURE — 80400 ACTH STIMULATION PANEL: CPT | Performed by: STUDENT IN AN ORGANIZED HEALTH CARE EDUCATION/TRAINING PROGRAM

## 2021-12-03 RX ORDER — EPINEPHRINE 1 MG/ML
0.3 INJECTION, SOLUTION INTRAMUSCULAR; SUBCUTANEOUS EVERY 5 MIN PRN
Status: CANCELLED | OUTPATIENT
Start: 2021-12-03

## 2021-12-03 RX ORDER — ALBUTEROL SULFATE 0.83 MG/ML
2.5 SOLUTION RESPIRATORY (INHALATION)
Status: CANCELLED | OUTPATIENT
Start: 2021-12-03

## 2021-12-03 RX ORDER — COSYNTROPIN 0.25 MG/ML
250 INJECTION, POWDER, FOR SOLUTION INTRAMUSCULAR; INTRAVENOUS ONCE
Status: COMPLETED | OUTPATIENT
Start: 2021-12-03 | End: 2021-12-03

## 2021-12-03 RX ORDER — ALBUTEROL SULFATE 90 UG/1
1-2 AEROSOL, METERED RESPIRATORY (INHALATION)
Status: CANCELLED
Start: 2021-12-03

## 2021-12-03 RX ORDER — MEPERIDINE HYDROCHLORIDE 25 MG/ML
25 INJECTION INTRAMUSCULAR; INTRAVENOUS; SUBCUTANEOUS EVERY 30 MIN PRN
Status: CANCELLED | OUTPATIENT
Start: 2021-12-03

## 2021-12-03 RX ORDER — DIPHENHYDRAMINE HYDROCHLORIDE 50 MG/ML
50 INJECTION INTRAMUSCULAR; INTRAVENOUS
Status: CANCELLED
Start: 2021-12-03

## 2021-12-03 RX ORDER — NALOXONE HYDROCHLORIDE 0.4 MG/ML
0.2 INJECTION, SOLUTION INTRAMUSCULAR; INTRAVENOUS; SUBCUTANEOUS
Status: CANCELLED | OUTPATIENT
Start: 2021-12-03

## 2021-12-03 RX ORDER — METHYLPREDNISOLONE SODIUM SUCCINATE 125 MG/2ML
125 INJECTION, POWDER, LYOPHILIZED, FOR SOLUTION INTRAMUSCULAR; INTRAVENOUS
Status: CANCELLED
Start: 2021-12-03

## 2021-12-03 RX ORDER — COSYNTROPIN 0.25 MG/ML
250 INJECTION, POWDER, FOR SOLUTION INTRAMUSCULAR; INTRAVENOUS ONCE
Status: CANCELLED
Start: 2021-12-03 | End: 2021-12-03

## 2021-12-03 RX ADMIN — COSYNTROPIN 250 MCG: 0.25 INJECTION, POWDER, LYOPHILIZED, FOR SOLUTION INTRAMUSCULAR; INTRAVENOUS at 08:29

## 2021-12-03 ASSESSMENT — PAIN SCALES - GENERAL: PAINLEVEL: NO PAIN (0)

## 2021-12-03 NOTE — PROGRESS NOTES
Infusion Nursing Note:  Alex Etienne presents today for ACTH testing.    Patient seen by provider today: No   present during visit today: Not Applicable.    Note: ACTH testing performed per therapy plan.      Intravenous Access:  Peripheral IV placed. Labs collected as directed.    Treatment Conditions:  Not Applicable.      Post Infusion Assessment:  Patient tolerated infusion without incident.  Site patent and intact, free from redness, edema or discomfort.  No evidence of extravasations.  Access discontinued per protocol.       Discharge Plan:   AVS to patient via MYCHART.   Patient discharged in stable condition accompanied by: self.  Departure Mode: Ambulatory.    Administrations This Visit     cosyntropin (CORTROSYN) injection 250 mcg     Admin Date  12/03/2021 Action  Given Dose  250 mcg Route  Intravenous Administered By  Cecy Montoya, QUENTIN Montoya RN

## 2021-12-03 NOTE — PATIENT INSTRUCTIONS
Dear Alex Etienne    Thank you for choosing [facility] ([facility]) for your ACTH testing.  The following information is a summary of our appointment as well as important reminders.      We look forward in seeing you on your next appointment here at [facility] ([facility]).  Please don t hesitate to call us at [phone] to reschedule any of your appointments or to speak with one of the [facility] registered nurses.  It was a pleasure taking care of you today.    Sincerely,      Cecy VILLALBA, RN  [facility]  [address] [place] [address]  [address]  Phone:  [phone]    Patient Education     ACTH (Blood)  Does this test have other names?  Adrenocorticotropic hormone blood test, corticotropin  What is this test?  This is a blood test that measures the amount of adrenocorticotropic hormone (ACTH) the pituitary gland produces. This gland is a tiny organ that sits just below your brain. It secretes adrenocorticotropic hormone, which controls the production of another hormone called cortisol.  Cortisol is made by your adrenal glands, which are located at the top of your kidneys. Cortisol helps break down protein, sugar, and fat in your food. It also helps to regulate your blood pressure and your body's ability to fight infection. Cortisol is also one of the hormones that helps you deal with stress. Cortisol levels should peak in the morning and be at their lowest in the evening.  Why do I need this test?  You might have this test if your healthcare provider suspects you have hormone problems. This test is used along with other tests to diagnose conditions, including:    Cushing disease, in which the pituitary gland makes too much ACTH    Cushing syndrome, in which the adrenal glands make too much cortisol    Willian disease, in which the adrenal glands don't make enough  cortisol    Hypopituitarism, a disorder of the pituitary gland that keeps it from producing enough vital hormones  What other tests might I have along with this test?  Your healthcare provider also might order tests to measure your cortisol levels, including:    Adrenocorticotropic hormone stimulation test to determine why your adrenal glands aren't working properly    Dexamethasone suppression test to find an underlying reason for Cushing    Adrenocorticotropic hormone stimulation with metyrapone test also to find an underlying reason for Cushing  What do my test results mean?  Many things may affect your lab test results. These include the method each lab uses to do the test. Even if your test results are different from the normal value, you may not have a problem. To learn what the results mean for you, talk with your healthcare provider.  ACTH is measured in picograms per milliliter (pg/mL). Test results are influenced by the time of day the test was done. Normal results are:    Adults: 6-76 pg/ml (1.3-16.7 pmol/L)  If your ACTH level is low or high you may have Cushing syndrome. If your ACTH level is high you may have Tell disease. A low ACTH level can also be an indication of hypopituitarism.  How is this test done?  The test requires a blood sample, which is drawn through a needle from a vein in your arm.  You may have blood drawn in the morning and in the afternoon or evening. This is to check for variations in your levels of ACTH hormone.  Does this test pose any risks?  Taking a blood sample with a needle carries risks that include bleeding, infection, bruising, or feeling dizzy. When the needle pricks your arm, you may feel a slight stinging sensation or pain. Afterward, the site may be slightly sore.  What might affect my test results?  Your test results might be affected if you:    Are under a great deal of stress    Recently had a trauma    Are menstruating or pregnant    Are taking certain  medicines, including steroids, hormones, or insulin    Did not sleep well the night before the test  How do I get ready for this test?  Don't eat after midnight on the day of your test. Get a good night's sleep. Follow any other directions from your healthcare provider. Be sure your healthcare provider knows about all medicines, herbs, vitamins, and supplements you are taking. This includes medicines that don't need a prescription and any illicit drugs you may use.  [employer] last reviewed this educational content on 4/1/2019 2000-2021 [employer]. All rights reserved. This information is not intended as a substitute for professional medical care. Always follow your healthcare professional's instructions.

## 2021-12-03 NOTE — LETTER
12/3/2021         RE: Alex Etienne  [address] [place] [address]        Dear Colleague,    Thank you for referring your patient, Alex Etienne, to the [facility]. Please see a copy of my visit note below.    Infusion Nursing Note:  Alex Etienne presents today for ACTH testing.    Patient seen by provider today: No   present during visit today: Not Applicable.    Note: ACTH testing performed per therapy plan.      Intravenous Access:  Peripheral IV placed. Labs collected as directed.    Treatment Conditions:  Not Applicable.      Post Infusion Assessment:  Patient tolerated infusion without incident.  Site patent and intact, free from redness, edema or discomfort.  No evidence of extravasations.  Access discontinued per protocol.       Discharge Plan:   AVS to patient via MYCHART.   Patient discharged in stable condition accompanied by: self.  Departure Mode: Ambulatory.    Administrations This Visit     cosyntropin (CORTROSYN) injection 250 mcg     Admin Date  12/03/2021 Action  Given Dose  250 mcg Route  Intravenous Administered By  Cecy Montoya, RN                  Cecy Monotya, RN                        Again, thank you for allowing me to participate in the care of your patient.        Sincerely,        [facility]

## 2021-12-06 PROBLEM — N18.31 CHRONIC KIDNEY DISEASE, STAGE 3A (H): Status: ACTIVE | Noted: 2021-12-06

## 2021-12-06 LAB — ACTH PLAS-MCNC: 52 PG/ML

## 2021-12-07 LAB — RENIN PLAS-CCNC: 48.2 NG/ML/HR

## 2022-07-24 ENCOUNTER — HEALTH MAINTENANCE LETTER (OUTPATIENT)
Age: 57
End: 2022-07-24

## 2022-10-03 ENCOUNTER — HEALTH MAINTENANCE LETTER (OUTPATIENT)
Age: 57
End: 2022-10-03

## 2023-08-12 ENCOUNTER — HEALTH MAINTENANCE LETTER (OUTPATIENT)
Age: 58
End: 2023-08-12

## 2024-10-05 ENCOUNTER — HEALTH MAINTENANCE LETTER (OUTPATIENT)
Age: 59
End: 2024-10-05

## (undated) DEVICE — INTRO SHEATH AVANTI 4FRX23CM 504604T

## (undated) DEVICE — CATH DIAG 4FR ANG PIG 538453S

## (undated) DEVICE — CATH ANGIO INFINITI 3DRC 4FRX100CM 538476

## (undated) DEVICE — MANIFOLD KIT ANGIO AUTOMATED 014613

## (undated) DEVICE — CATH ANGIO INFINITI JL4 4FRX100CM 538420

## (undated) DEVICE — KIT HAND CONTROL ACIST 014644 AR-P54

## (undated) DEVICE — PACK HEART LEFT CUSTOM

## (undated) RX ORDER — LIDOCAINE HYDROCHLORIDE 10 MG/ML
INJECTION, SOLUTION EPIDURAL; INFILTRATION; INTRACAUDAL; PERINEURAL
Status: DISPENSED
Start: 2021-06-10

## (undated) RX ORDER — SODIUM CHLORIDE 9 MG/ML
INJECTION, SOLUTION INTRAVENOUS
Status: DISPENSED
Start: 2021-06-10

## (undated) RX ORDER — FENTANYL CITRATE 50 UG/ML
INJECTION, SOLUTION INTRAMUSCULAR; INTRAVENOUS
Status: DISPENSED
Start: 2021-06-10

## (undated) RX ORDER — HEPARIN SODIUM 1000 [USP'U]/ML
INJECTION, SOLUTION INTRAVENOUS; SUBCUTANEOUS
Status: DISPENSED
Start: 2021-06-10

## (undated) RX ORDER — NITROGLYCERIN 5 MG/ML
VIAL (ML) INTRAVENOUS
Status: DISPENSED
Start: 2021-06-10